# Patient Record
Sex: MALE | Race: WHITE | Employment: FULL TIME | URBAN - METROPOLITAN AREA
[De-identification: names, ages, dates, MRNs, and addresses within clinical notes are randomized per-mention and may not be internally consistent; named-entity substitution may affect disease eponyms.]

---

## 2021-11-28 ENCOUNTER — HOSPITAL ENCOUNTER (EMERGENCY)
Age: 41
Discharge: PSYCHIATRIC OR SUBSTANCE USE FACILITY | End: 2021-11-29
Attending: EMERGENCY MEDICINE
Payer: COMMERCIAL

## 2021-11-28 VITALS
DIASTOLIC BLOOD PRESSURE: 113 MMHG | BODY MASS INDEX: 24.34 KG/M2 | HEIGHT: 70 IN | HEART RATE: 94 BPM | RESPIRATION RATE: 20 BRPM | WEIGHT: 170 LBS | SYSTOLIC BLOOD PRESSURE: 159 MMHG | TEMPERATURE: 99 F | OXYGEN SATURATION: 98 %

## 2021-11-28 DIAGNOSIS — F20.9 SCHIZOPHRENIA, UNSPECIFIED TYPE (HCC): Primary | ICD-10-CM

## 2021-11-28 DIAGNOSIS — R46.89 AGGRESSIVE BEHAVIOR: ICD-10-CM

## 2021-11-28 LAB
ALBUMIN SERPL-MCNC: 3.8 G/DL (ref 3.5–5)
ALBUMIN/GLOB SERPL: 1.3 {RATIO} (ref 1.1–2.2)
ALP SERPL-CCNC: 68 U/L (ref 45–117)
ALT SERPL-CCNC: 36 U/L (ref 12–78)
AMPHET UR QL SCN: NEGATIVE
ANION GAP SERPL CALC-SCNC: 10 MMOL/L (ref 5–15)
APAP SERPL-MCNC: <2 UG/ML (ref 10–30)
APPEARANCE UR: CLEAR
AST SERPL-CCNC: 48 U/L (ref 15–37)
BACTERIA URNS QL MICRO: NEGATIVE /HPF
BARBITURATES UR QL SCN: NEGATIVE
BASOPHILS # BLD: 0 K/UL (ref 0–0.1)
BASOPHILS NFR BLD: 0 % (ref 0–1)
BENZODIAZ UR QL: NEGATIVE
BILIRUB SERPL-MCNC: 1.2 MG/DL (ref 0.2–1)
BILIRUB UR QL CFM: NEGATIVE
BUN SERPL-MCNC: 17 MG/DL (ref 6–20)
BUN/CREAT SERPL: 13 (ref 12–20)
CALCIUM SERPL-MCNC: 9.3 MG/DL (ref 8.5–10.1)
CANNABINOIDS UR QL SCN: POSITIVE
CHLORIDE SERPL-SCNC: 105 MMOL/L (ref 97–108)
CO2 SERPL-SCNC: 25 MMOL/L (ref 21–32)
COCAINE UR QL SCN: NEGATIVE
COLOR UR: ABNORMAL
COVID-19 RAPID TEST, COVR: NOT DETECTED
CREAT SERPL-MCNC: 1.28 MG/DL (ref 0.7–1.3)
DIFFERENTIAL METHOD BLD: ABNORMAL
DRUG SCRN COMMENT,DRGCM: ABNORMAL
EOSINOPHIL # BLD: 0 K/UL (ref 0–0.4)
EOSINOPHIL NFR BLD: 0 % (ref 0–7)
EPITH CASTS URNS QL MICRO: ABNORMAL /LPF
ERYTHROCYTE [DISTWIDTH] IN BLOOD BY AUTOMATED COUNT: 11.3 % (ref 11.5–14.5)
ETHANOL SERPL-MCNC: <10 MG/DL
GLOBULIN SER CALC-MCNC: 2.9 G/DL (ref 2–4)
GLUCOSE SERPL-MCNC: 101 MG/DL (ref 65–100)
GLUCOSE UR STRIP.AUTO-MCNC: NEGATIVE MG/DL
HCT VFR BLD AUTO: 38.7 % (ref 36.6–50.3)
HGB BLD-MCNC: 13.5 G/DL (ref 12.1–17)
HGB UR QL STRIP: NEGATIVE
HYALINE CASTS URNS QL MICRO: ABNORMAL /LPF (ref 0–5)
IMM GRANULOCYTES # BLD AUTO: 0 K/UL (ref 0–0.04)
IMM GRANULOCYTES NFR BLD AUTO: 0 % (ref 0–0.5)
KETONES UR QL STRIP.AUTO: 40 MG/DL
LEUKOCYTE ESTERASE UR QL STRIP.AUTO: NEGATIVE
LYMPHOCYTES # BLD: 1.6 K/UL (ref 0.8–3.5)
LYMPHOCYTES NFR BLD: 15 % (ref 12–49)
MCH RBC QN AUTO: 33.9 PG (ref 26–34)
MCHC RBC AUTO-ENTMCNC: 34.9 G/DL (ref 30–36.5)
MCV RBC AUTO: 97.2 FL (ref 80–99)
METHADONE UR QL: NEGATIVE
MONOCYTES # BLD: 1 K/UL (ref 0–1)
MONOCYTES NFR BLD: 10 % (ref 5–13)
NEUTS SEG # BLD: 7.8 K/UL (ref 1.8–8)
NEUTS SEG NFR BLD: 75 % (ref 32–75)
NITRITE UR QL STRIP.AUTO: NEGATIVE
NRBC # BLD: 0 K/UL (ref 0–0.01)
NRBC BLD-RTO: 0 PER 100 WBC
OPIATES UR QL: NEGATIVE
PCP UR QL: NEGATIVE
PH UR STRIP: 5.5 [PH] (ref 5–8)
PLATELET # BLD AUTO: 146 K/UL (ref 150–400)
PMV BLD AUTO: 9.9 FL (ref 8.9–12.9)
POTASSIUM SERPL-SCNC: 3.6 MMOL/L (ref 3.5–5.1)
PROT SERPL-MCNC: 6.7 G/DL (ref 6.4–8.2)
PROT UR STRIP-MCNC: 30 MG/DL
RBC # BLD AUTO: 3.98 M/UL (ref 4.1–5.7)
RBC #/AREA URNS HPF: ABNORMAL /HPF (ref 0–5)
SALICYLATES SERPL-MCNC: 4.5 MG/DL (ref 2.8–20)
SODIUM SERPL-SCNC: 140 MMOL/L (ref 136–145)
SOURCE, COVRS: NORMAL
SP GR UR REFRACTOMETRY: 1.03 (ref 1–1.03)
UA: UC IF INDICATED,UAUC: ABNORMAL
UROBILINOGEN UR QL STRIP.AUTO: 1 EU/DL (ref 0.2–1)
WBC # BLD AUTO: 10.4 K/UL (ref 4.1–11.1)
WBC URNS QL MICRO: ABNORMAL /HPF (ref 0–4)

## 2021-11-28 PROCEDURE — 87635 SARS-COV-2 COVID-19 AMP PRB: CPT

## 2021-11-28 PROCEDURE — 81001 URINALYSIS AUTO W/SCOPE: CPT

## 2021-11-28 PROCEDURE — 80307 DRUG TEST PRSMV CHEM ANLYZR: CPT

## 2021-11-28 PROCEDURE — 80179 DRUG ASSAY SALICYLATE: CPT

## 2021-11-28 PROCEDURE — 96372 THER/PROPH/DIAG INJ SC/IM: CPT

## 2021-11-28 PROCEDURE — 80053 COMPREHEN METABOLIC PANEL: CPT

## 2021-11-28 PROCEDURE — 85025 COMPLETE CBC W/AUTO DIFF WBC: CPT

## 2021-11-28 PROCEDURE — 80143 DRUG ASSAY ACETAMINOPHEN: CPT

## 2021-11-28 PROCEDURE — 36415 COLL VENOUS BLD VENIPUNCTURE: CPT

## 2021-11-28 PROCEDURE — 82077 ASSAY SPEC XCP UR&BREATH IA: CPT

## 2021-11-28 PROCEDURE — 99284 EMERGENCY DEPT VISIT MOD MDM: CPT

## 2021-11-28 NOTE — ED NOTES
RN made 2 attempts to draw blood as ordered, pt refused to be stuck or get COVID swab. Officer at bedside, [address] at bedside. Per Crisis, pt has right to refuse and will be TDO pending further work up.

## 2021-11-29 ENCOUNTER — HOSPITAL ENCOUNTER (INPATIENT)
Age: 41
LOS: 3 days | Discharge: HOME | DRG: 753 | End: 2021-12-02
Attending: PSYCHIATRY & NEUROLOGY | Admitting: PSYCHIATRY & NEUROLOGY
Payer: COMMERCIAL

## 2021-11-29 PROBLEM — F31.2 BIPOLAR DISORDER, CURRENT EPISODE MANIC, SEVERE WITH PSYCHOTIC FEATURES (HCC): Status: ACTIVE | Noted: 2021-11-29

## 2021-11-29 PROCEDURE — 74011000250 HC RX REV CODE- 250: Performed by: EMERGENCY MEDICINE

## 2021-11-29 PROCEDURE — 96372 THER/PROPH/DIAG INJ SC/IM: CPT

## 2021-11-29 PROCEDURE — 65220000003 HC RM SEMIPRIVATE PSYCH

## 2021-11-29 PROCEDURE — 74011250636 HC RX REV CODE- 250/636: Performed by: EMERGENCY MEDICINE

## 2021-11-29 RX ORDER — LORAZEPAM 2 MG/ML
1 INJECTION INTRAMUSCULAR
Status: DISCONTINUED | OUTPATIENT
Start: 2021-11-29 | End: 2021-12-02 | Stop reason: HOSPADM

## 2021-11-29 RX ORDER — BENZTROPINE MESYLATE 1 MG/1
1 TABLET ORAL
Status: DISCONTINUED | OUTPATIENT
Start: 2021-11-29 | End: 2021-12-02 | Stop reason: HOSPADM

## 2021-11-29 RX ORDER — DIPHENHYDRAMINE HYDROCHLORIDE 50 MG/ML
50 INJECTION, SOLUTION INTRAMUSCULAR; INTRAVENOUS
Status: DISCONTINUED | OUTPATIENT
Start: 2021-11-29 | End: 2021-12-02 | Stop reason: HOSPADM

## 2021-11-29 RX ORDER — ADHESIVE BANDAGE
30 BANDAGE TOPICAL DAILY PRN
Status: DISCONTINUED | OUTPATIENT
Start: 2021-11-29 | End: 2021-12-02 | Stop reason: HOSPADM

## 2021-11-29 RX ORDER — TRAZODONE HYDROCHLORIDE 50 MG/1
50 TABLET ORAL
Status: DISCONTINUED | OUTPATIENT
Start: 2021-11-29 | End: 2021-12-01

## 2021-11-29 RX ORDER — HALOPERIDOL 5 MG/ML
5 INJECTION INTRAMUSCULAR
Status: DISCONTINUED | OUTPATIENT
Start: 2021-11-29 | End: 2021-12-02 | Stop reason: HOSPADM

## 2021-11-29 RX ORDER — HYDROXYZINE 25 MG/1
50 TABLET, FILM COATED ORAL
Status: DISCONTINUED | OUTPATIENT
Start: 2021-11-29 | End: 2021-12-02 | Stop reason: HOSPADM

## 2021-11-29 RX ORDER — OLANZAPINE 5 MG/1
5 TABLET ORAL
Status: DISCONTINUED | OUTPATIENT
Start: 2021-11-29 | End: 2021-12-02 | Stop reason: HOSPADM

## 2021-11-29 RX ORDER — ZIPRASIDONE MESYLATE 20 MG/ML
INJECTION, POWDER, LYOPHILIZED, FOR SOLUTION INTRAMUSCULAR
Status: DISCONTINUED
Start: 2021-11-29 | End: 2021-11-29 | Stop reason: HOSPADM

## 2021-11-29 RX ORDER — ACETAMINOPHEN 325 MG/1
650 TABLET ORAL
Status: DISCONTINUED | OUTPATIENT
Start: 2021-11-29 | End: 2021-12-02 | Stop reason: HOSPADM

## 2021-11-29 RX ADMIN — WATER 20 MG: 1 INJECTION INTRAMUSCULAR; INTRAVENOUS; SUBCUTANEOUS at 02:06

## 2021-11-29 NOTE — BH NOTES
PSYCHOSOCIAL ASSESSMENT  :Patient identifying info:   Brandee Rodrigez is a 46 y.o., male admitted 11/29/2021  8:55 AM     Presenting problem and precipitating factors: per chart review, patient [place] [address], preaching to and stabbing pumpkins. He has reportedly been without medications for at least two months. Initial presentation was labile mood and attitude, grew increasingly agitated. In the hospital, he was largely uncooperative with some verbalized aggression toward staff. He endorsed AH (God speaks to him). Patient reports there was traffic going [place] [address]. Pulled off to the side of the road. He was \"doing something\" and \"talking to someone, we can call him God\". People may have thought he was directing it toward someone else. Denied SI/HI, AVH. Asked about pumpkins and knife- he reports \"it was about something else\". Reports he's here to help because his mission never ends. Reports his mother likes him on medicine but he doesn't need them.      Mental status assessment: pressured speech, spiritually preoccupied, prolonged eye contact, labile mood, tangential thought stream, content delusional, poor insight and poor judgment    Strengths: desire to do better, working on gaining more insight into himself    Collateral information: Samaria Dao (mother) [phone]    Current psychiatric /substance abuse providers and contact info: no current providers    Previous psychiatric/substance abuse providers and response to treatment: multiple past admissions including [facility] in 2020 for one month    Family history of mental illness or substance abuse: none reported    Substance abuse history:  UDS+ THC, BAL<10; reports he smokes marijuana, smokes cigarettes, and drinks a beer occasionally  Social History     Tobacco Use    Smoking status: Current Every Day Smoker    Smokeless tobacco: Not on file   Substance Use Topics    Alcohol use: Yes       History of biomedical complications associated with substance abuse : none reported    Patient's current acceptance of treatment or motivation for change: TDO    Family constellation: asked, reports he's unsure; no kids    Is significant other involved? n/a    Describe support system: reports limited supports    Describe living arrangements and home environment: homeless- moved out of mother's house about a week ago    Health issues: reports \"probably\"; family history of hypertension.  Complaining of pain in collarbone (left), reports incident at age 18    Trauma history: none reported    Legal issues: several driving charges; court in [place] on 21- dismissed animal cruelty, disorderly conduct, guilty of FTC-CA    History of  service: none reported    Financial status: employed    Confucianist/cultural factors: spiritually preoccupied with his mission    Education/work history:     Have you been licensed as a health care professional (current or ): no    Leisure and recreation preferences: listening to music, working    Describe coping skills: limited and ineffective    Tori Del Toro  2021

## 2021-11-29 NOTE — BH NOTES
GROUP THERAPY PROGRESS NOTE    Patient did not participate in Discharge Planning Group.  Asked not to wake or invite patient as he just arrived to the unit and wanted to rest.    [address] [facility]

## 2021-11-29 NOTE — ED NOTES
Bedside and Verbal shift change report given to this RN (oncoming nurse) by Elmer Carr RN (offgoing nurse). Report included the following information SBAR.

## 2021-11-29 NOTE — PROGRESS NOTES
Problem: Anxiety  Goal: *Alleviation of anxiety  Outcome: Progressing Towards Goal  Goal: *Alleviation of anxiety (Palliative Care)  Outcome: Progressing Towards Goal     Problem: Altered Thought Process (Adult/Pediatric)  Goal: *STG: Participates in treatment plan  Outcome: Progressing Towards Goal

## 2021-11-29 NOTE — ED NOTES
Bedside and Verbal shift change report given to Aylin Hoffmann RN (oncoming nurse) by Liya Pina RN (offgoing nurse). Report included the following information SBAR, Kardex, ED Summary, Intake/Output, MAR and Recent Results.

## 2021-11-29 NOTE — ED NOTES
RN again attempted to get blood work and COVID swab, pt refused. Pt offered food and water and said he would throw at staff.

## 2021-11-29 NOTE — GROUP NOTE
JORGE LUIS  GROUP DOCUMENTATION INDIVIDUAL                                                                          Group Therapy Note    Date: 11/29/2021    Group Start Time: 0900  Group End Time: 1000  Group Topic: Topic Group    [facility] 3 ACUTE [facility]    [place], [address] [facility] GROUP DOCUMENTATION GROUP    Group Therapy Note    Attendees: 5         Attendance: Attended    Patient's Goal:  To develop a personal plan for success    Interventions/techniques: Supported-positive coping strategies,goals    Follows Directions:  Followed directions    Interactions: Interacted appropriately    Mental Status: Calm    Behavior/appearance: Attentive, Cooperative and Needed prompting    Goals Achieved: Able to engage in interactions, Able to listen to others, Able to self-disclose and Discussed coping    Tad Destini

## 2021-11-29 NOTE — PROGRESS NOTES
Behavioral Services  Medicare Certification Upon Admission    I certify that this patient's inpatient psychiatric hospital admission is medically necessary for:    [x] (1) Treatment which could reasonably be expected to improve this patient's condition,       [x] (2) Or for diagnostic study;     AND     [x](2) The inpatient psychiatric services are provided while the individual is under the care of a physician and are included in the individualized plan of care.     Estimated length of stay/service 5 - 7 days    Plan for post-hospital care per social work    Electronically signed by Roge Senior MD on 11/29/2021 at 9:38 AM

## 2021-11-29 NOTE — ED NOTES
Patient awake and yelling inappropriate things at the deputy sitting with him. Patient becoming aggressive and making threats. MD Georges Luna made aware. Verbal given by MD Georges Luna for 20mg Geodon IM. Patient continues to refuse vitals. He remains in forensic restraints. [place] remains at bedside.

## 2021-11-29 NOTE — ED NOTES
Patient asleep on stretcher, laying on right side. Respirations equal and non labored. Patient remains in forensic restraints. [place] remains at bedside.

## 2021-11-29 NOTE — PROGRESS NOTES
Problem: Discharge Planning  Goal: *Discharge to safe environment  Outcome: Not Progressing Towards Goal  Note: Patient identifies home as a safe environment. Patient will return home upon discharge. Goal: *Knowledge of medication management  Outcome: Not Progressing Towards Goal  Note: Patient is refusing all medications. Goal: *Knowledge of discharge instructions  Outcome: Not Progressing Towards Goal  Note: Patient does not verbalize understanding of goals for treatment or safe discharge.

## 2021-11-29 NOTE — PROGRESS NOTES
Pt is a poor historian while assessing. Pt previous notes state the pt has a history of schizophrenia. Family had called 's office due to pt being off medication for at least 2 months. Pt had been going around town slashing pumpkins and authorities had witnessed pt waving a knife in the air. While in ED pt was yelling and inappropriate. Pt was religiously preoccupied and having AH which he claimed were God speaking to him. In person pt is irritable about being on the unit and denies having a knife and anything involving smashing pumpkins. Pt's report was that he was driving down [address] and there was a large amount of traffic that had come to a halt so they pulled off to side of the rode and began speaking to God. Pt remains religiously preoccupied and blaming the police for being admitted. Pt talks about how [facility] is not free anymore and that only people who act like robots and without emotions are allowed to exist.    At 1800 Pt has become more calm and collective when speaking. Pt is concerned about which company had towed his truck so he knows where to pick it up at when being discharged. [facility] [employer] [facility]aryl [facility] recovery was contacted and they state they had not towed a 2016 [employer] today.

## 2021-11-29 NOTE — ED PROVIDER NOTES
EMERGENCY DEPARTMENT HISTORY AND PHYSICAL EXAM      Date: 11/28/2021  Patient Name: Johana Treviño    History of Presenting Illness     Chief Complaint   Patient presents with    Mental Health Problem     pt's family called [facility]'s office with concern that pt is schizophrenic and off his meds, was seen stabbing pumpkins around town and then seen weilding knife in air by authorities and [employer] issued [phone]       History Provided By: Patient    HPI: Johana Treviño, 46 y.o. male presents to the ED with cc of mental health problem. According to nursing notes, the patient has history of schizophrenia. His family called the 's office, because they were concerned that he has been out of his medications. He also has been going around town [employer], and then the authorities saw him wielding a knife in the air. He denies suicidal or homicidal ideation currently. He denies chest pain, shortness of breath, fever, abdominal pain, headache, diarrhea or dysuria. He is unvaccinated against COVID-19    There are no other complaints, changes, or physical findings at this time. PCP: None    No current facility-administered medications on file prior to encounter. No current outpatient medications on file prior to encounter. Past History     Past Medical History:  Past Medical History:   Diagnosis Date    Psychiatric disorder        Past Surgical History:  No past surgical history on file. Family History:  No family history on file. Social History:  Social History     Tobacco Use    Smoking status: Current Every Day Smoker    Smokeless tobacco: Not on file   Substance Use Topics    Alcohol use: Yes    Drug use: Not on file       Allergies:  No Known Allergies      Review of Systems   Review of Systems   Constitutional: Negative for fever. HENT: Negative for congestion. Eyes: Negative. Respiratory: Negative for shortness of breath. Cardiovascular: Negative for chest pain. Gastrointestinal: Negative for abdominal pain. Endocrine: Negative for heat intolerance. Genitourinary: Negative. Musculoskeletal: Negative for back pain. Skin: Negative for rash. Allergic/Immunologic: Negative for immunocompromised state. Neurological: Negative for dizziness. Hematological: Does not bruise/bleed easily. Psychiatric/Behavioral: Negative. All other systems reviewed and are negative. Physical Exam   Physical Exam  Vitals and nursing note reviewed. Constitutional:       General: He is not in acute distress. Appearance: He is well-developed. HENT:      Head: Normocephalic and atraumatic. Cardiovascular:      Rate and Rhythm: Normal rate and regular rhythm. Heart sounds: Normal heart sounds. Pulmonary:      Effort: Pulmonary effort is normal.      Breath sounds: Normal breath sounds. Abdominal:      General: Bowel sounds are normal.      Palpations: Abdomen is soft. Musculoskeletal:         General: Normal range of motion. Cervical back: Normal range of motion. Skin:     General: Skin is warm and dry. Neurological:      General: No focal deficit present. Mental Status: He is alert and oriented to person, place, and time. Coordination: Coordination normal.   Psychiatric:      Comments:  Thought content is disorganized, the patient fluctuates from being cooperative to being uncooperative         Diagnostic Study Results     Labs -     Recent Results (from the past 12 hour(s))   CBC WITH AUTOMATED DIFF    Collection Time: 11/28/21  9:19 PM   Result Value Ref Range    WBC 10.4 4.1 - 11.1 K/uL    RBC 3.98 (L) 4.10 - 5.70 M/uL    HGB 13.5 12.1 - 17.0 g/dL    HCT 38.7 36.6 - 50.3 %    MCV 97.2 80.0 - 99.0 FL    MCH 33.9 26.0 - 34.0 PG    MCHC 34.9 30.0 - 36.5 g/dL    RDW 11.3 (L) 11.5 - 14.5 %    PLATELET 562 (L) 520 - 400 K/uL    MPV 9.9 8.9 - 12.9 FL    NRBC 0.0 0  WBC    ABSOLUTE NRBC 0.00 0.00 - 0.01 K/uL    NEUTROPHILS 75 32 - 75 % LYMPHOCYTES 15 12 - 49 %    MONOCYTES 10 5 - 13 %    EOSINOPHILS 0 0 - 7 %    BASOPHILS 0 0 - 1 %    IMMATURE GRANULOCYTES 0 0.0 - 0.5 %    ABS. NEUTROPHILS 7.8 1.8 - 8.0 K/UL    ABS. LYMPHOCYTES 1.6 0.8 - 3.5 K/UL    ABS. MONOCYTES 1.0 0.0 - 1.0 K/UL    ABS. EOSINOPHILS 0.0 0.0 - 0.4 K/UL    ABS. BASOPHILS 0.0 0.0 - 0.1 K/UL    ABS. IMM. GRANS. 0.0 0.00 - 0.04 K/UL    DF AUTOMATED     METABOLIC PANEL, COMPREHENSIVE    Collection Time: 11/28/21  9:19 PM   Result Value Ref Range    Sodium 140 136 - 145 mmol/L    Potassium 3.6 3.5 - 5.1 mmol/L    Chloride 105 97 - 108 mmol/L    CO2 25 21 - 32 mmol/L    Anion gap 10 5 - 15 mmol/L    Glucose 101 (H) 65 - 100 mg/dL    BUN 17 6 - 20 MG/DL    Creatinine 1.28 0.70 - 1.30 MG/DL    BUN/Creatinine ratio 13 12 - 20      GFR est AA >60 >60 ml/min/1.73m2    GFR est non-AA >60 >60 ml/min/1.73m2    Calcium 9.3 8.5 - 10.1 MG/DL    Bilirubin, total 1.2 (H) 0.2 - 1.0 MG/DL    ALT (SGPT) 36 12 - 78 U/L    AST (SGOT) 48 (H) 15 - 37 U/L    Alk.  phosphatase 68 45 - 117 U/L    Protein, total 6.7 6.4 - 8.2 g/dL    Albumin 3.8 3.5 - 5.0 g/dL    Globulin 2.9 2.0 - 4.0 g/dL    A-G Ratio 1.3 1.1 - 2.2     ETHYL ALCOHOL    Collection Time: 11/28/21  9:19 PM   Result Value Ref Range    ALCOHOL(ETHYL),SERUM <72 <63 MG/DL   SALICYLATE    Collection Time: 11/28/21  9:19 PM   Result Value Ref Range    Salicylate level 4.5 2.8 - 20.0 MG/DL   ACETAMINOPHEN    Collection Time: 11/28/21  9:19 PM   Result Value Ref Range    Acetaminophen level <2 (L) 10 - 30 ug/mL   COVID-19 RAPID TEST    Collection Time: 11/28/21  9:19 PM   Result Value Ref Range    Specimen source Nasopharyngeal      COVID-19 rapid test Not detected NOTD     DRUG SCREEN, URINE    Collection Time: 11/28/21 10:09 PM   Result Value Ref Range    AMPHETAMINES Negative NEG      BARBITURATES Negative NEG      BENZODIAZEPINES Negative NEG      COCAINE Negative NEG      METHADONE Negative NEG      OPIATES Negative NEG      PCP(PHENCYCLIDINE) Negative NEG      THC (TH-CANNABINOL) Positive (A) NEG      Drug screen comment (NOTE)    URINALYSIS W/ REFLEX CULTURE    Collection Time: 11/28/21 10:09 PM    Specimen: Urine    Urine specimen   Result Value Ref Range    Color DARK YELLOW      Appearance CLEAR CLEAR      Specific gravity 1.027 1.003 - 1.030      pH (UA) 5.5 5.0 - 8.0      Protein 30 (A) NEG mg/dL    Glucose Negative NEG mg/dL    Ketone 40 (A) NEG mg/dL    Blood Negative NEG      Urobilinogen 1.0 0.2 - 1.0 EU/dL    Nitrites Negative NEG      Leukocyte Esterase Negative NEG      WBC 0-4 0 - 4 /hpf    RBC 0-5 0 - 5 /hpf    Epithelial cells FEW FEW /lpf    Bacteria Negative NEG /hpf    UA:UC IF INDICATED CULTURE NOT INDICATED BY UA RESULT CNI      Hyaline cast 2-5 0 - 5 /lpf   BILIRUBIN, CONFIRM    Collection Time: 11/28/21 10:09 PM   Result Value Ref Range    Bilirubin UA, confirm Negative NEG         Radiologic Studies -   No orders to display     CT Results  (Last 48 hours)    None        CXR Results  (Last 48 hours)    None          Medical Decision Making   I am the first provider for this patient. I reviewed the vital signs, available nursing notes, past medical history, past surgical history, family history and social history. Vital Signs-Reviewed the patient's vital signs. Patient Vitals for the past 12 hrs:   Temp Pulse Resp BP SpO2   11/28/21 1928 99 °F (37.2 °C)       11/28/21 1737  94 20 (!) 159/113 98 %           Records Reviewed: Nursing Notes and Old Medical Records    Provider Notes (Medical Decision Making):   I, schizophrenia, homicidal ideation    ED Course:   Initial assessment performed. The patients presenting problems have been discussed, and they are in agreement with the care plan formulated and outlined with them. I have encouraged them to ask questions as they arise throughout their visit. Consult note:    Patient is under emergency custody order. He has been evaluated by [facility].       Progress note:    Patient is medically cleared             Critical Care Time:   0    Disposition:  Transfer    DISCHARGE PLAN:  1. There are no discharge medications for this patient. 2.   Follow-up Information    None       3. Return to ED if worse     Diagnosis     Clinical Impression:   1. Schizophrenia, unspecified type ([facility] [address]    2. Aggressive behavior        Attestations:    Roderick Zaman MD    Please note that this dictation was completed with [employer], the computer voice recognition software. Quite often unanticipated grammatical, syntax, homophones, and other interpretive errors are inadvertently transcribed by the computer software. Please disregard these errors. Please excuse any errors that have escaped final proofreading. Thank you.

## 2021-11-29 NOTE — ED NOTES
Attempted to get a full set of vitals from pt. Pt refused and stated, \"come back in an hour or so\".

## 2021-11-29 NOTE — PROGRESS NOTES
[facility] Admission Pharmacy Medication Reconciliation     Information obtained from: Patient interview  RxQuery data available1: No    Comments/recommendations: Denies taking any prescription or over-the-counter medications. Medication changes (since last review): None    The [facility] () was accessed to determine fill history of any controlled medications. No controlled medications filled within the past 2 years. 1RxQuery pharmacy benefit data reflects medications filled and processed through the patient's insurance, however                this data does NOT capture whether the medication was picked up or is currently being taken by the patient. Total Time Spent: 5 minutes    Past Medical History/Disease States:  Past Medical History:   Diagnosis Date    Psychiatric disorder        Patient allergies:    Allergies as of 11/29/2021    (No Known Allergies)       Prior to admission medications:   None        Thank you,  HENNY Sanders [facility], [address] [place] [address] Nw: [phone] ([id number])  Pharmacy: [phone] ([id number])

## 2021-11-30 LAB
ALBUMIN SERPL-MCNC: 4.3 G/DL (ref 3.5–5)
ALBUMIN/GLOB SERPL: 1.6 {RATIO} (ref 1.1–2.2)
ALP SERPL-CCNC: 81 U/L (ref 45–117)
ALT SERPL-CCNC: 41 U/L (ref 12–78)
ANION GAP SERPL CALC-SCNC: 6 MMOL/L (ref 5–15)
AST SERPL-CCNC: 36 U/L (ref 15–37)
BILIRUB SERPL-MCNC: 0.7 MG/DL (ref 0.2–1)
BUN SERPL-MCNC: 14 MG/DL (ref 6–20)
BUN/CREAT SERPL: 12 (ref 12–20)
CALCIUM SERPL-MCNC: 9 MG/DL (ref 8.5–10.1)
CHLORIDE SERPL-SCNC: 106 MMOL/L (ref 97–108)
CHOLEST SERPL-MCNC: 99 MG/DL
CO2 SERPL-SCNC: 28 MMOL/L (ref 21–32)
CREAT SERPL-MCNC: 1.13 MG/DL (ref 0.7–1.3)
ERYTHROCYTE [DISTWIDTH] IN BLOOD BY AUTOMATED COUNT: 11.3 % (ref 11.5–14.5)
GLOBULIN SER CALC-MCNC: 2.7 G/DL (ref 2–4)
GLUCOSE SERPL-MCNC: 95 MG/DL (ref 65–100)
HCT VFR BLD AUTO: 44.5 % (ref 36.6–50.3)
HDLC SERPL-MCNC: 46 MG/DL
HDLC SERPL: 2.2 {RATIO} (ref 0–5)
HGB BLD-MCNC: 15 G/DL (ref 12.1–17)
LDLC SERPL CALC-MCNC: 39.2 MG/DL (ref 0–100)
MCH RBC QN AUTO: 34.2 PG (ref 26–34)
MCHC RBC AUTO-ENTMCNC: 33.7 G/DL (ref 30–36.5)
MCV RBC AUTO: 101.4 FL (ref 80–99)
NRBC # BLD: 0 K/UL (ref 0–0.01)
NRBC BLD-RTO: 0 PER 100 WBC
PLATELET # BLD AUTO: 162 K/UL (ref 150–400)
PMV BLD AUTO: 10.7 FL (ref 8.9–12.9)
POTASSIUM SERPL-SCNC: 4.3 MMOL/L (ref 3.5–5.1)
PROT SERPL-MCNC: 7 G/DL (ref 6.4–8.2)
RBC # BLD AUTO: 4.39 M/UL (ref 4.1–5.7)
SODIUM SERPL-SCNC: 140 MMOL/L (ref 136–145)
TRIGL SERPL-MCNC: 69 MG/DL (ref ?–150)
TSH SERPL DL<=0.05 MIU/L-ACNC: 1 UIU/ML (ref 0.36–3.74)
VLDLC SERPL CALC-MCNC: 13.8 MG/DL
WBC # BLD AUTO: 7 K/UL (ref 4.1–11.1)

## 2021-11-30 PROCEDURE — 36415 COLL VENOUS BLD VENIPUNCTURE: CPT

## 2021-11-30 PROCEDURE — 80061 LIPID PANEL: CPT

## 2021-11-30 PROCEDURE — 80053 COMPREHEN METABOLIC PANEL: CPT

## 2021-11-30 PROCEDURE — 65220000003 HC RM SEMIPRIVATE PSYCH

## 2021-11-30 PROCEDURE — 85027 COMPLETE CBC AUTOMATED: CPT

## 2021-11-30 PROCEDURE — 84443 ASSAY THYROID STIM HORMONE: CPT

## 2021-11-30 NOTE — GROUP NOTE
[facility] GROUP DOCUMENTATION INDIVIDUAL                                                                          Group Therapy Note    Date: 11/30/2021    Group Start Time: 0900  Group End Time: 1000  Group Topic: Topic Group    [address] [facility] 3 ACUTE BEHAV [facility], [address] [facility] GROUP DOCUMENTATION GROUP    Group Therapy Note    Attendees: 6         Attendance: Did not attend    Patient's Goal:      Interventions/techniques:  Piyush Rousseau

## 2021-11-30 NOTE — BH NOTES
Psychiatric Progress Note    Patient: Onetha Klinefelter MRN: [id number]  SSN: [id number]    YOB: 1980  Age: 46 y.o. Sex: male      Admit Date: 11/29/2021    LOS: 1 day     Subjective:     Onetha Klinefelter  reports feeling much better and moods are upset. States having bills to pay and debts to fulfill. Denies SI/HI/AH/VH. No aggression or violence. Appropriately interactive and aware. Tolerating medications well. Eating well and sleeping well. Waiting his day in court in order to return to his life.     Objective:     Vitals:    11/29/21 1046 11/29/21 1944 11/30/21 0826   BP: (!) 148/92 133/64 137/82   Pulse: 90 (!) 53 83   Resp: 16 15 18   Temp: 98.8 °F (37.1 °C) 98.7 °F (37.1 °C) 98 °F (36.7 °C)   SpO2:   100%        Mental Status Exam:   Sensorium  oriented to time, place and person   Relations cooperative   Eye Contact appropriate   Appearance:  age appropriate   Speech:  normal volume and non-pressured   Thought Process: goal directed   Thought Content free of hallucinations   Suicidal ideations none   Mood:  depressed   Affect:  Fair range   Memory   limited   Concentration:  adequate   Insight:  limited   Judgment:  fair       MEDICATIONS:  Current Facility-Administered Medications   Medication Dose Route Frequency    OLANZapine (ZyPREXA) tablet 5 mg  5 mg Oral Q6H PRN    haloperidol lactate (HALDOL) injection 5 mg  5 mg IntraMUSCular Q6H PRN    benztropine (COGENTIN) tablet 1 mg  1 mg Oral BID PRN    diphenhydrAMINE (BENADRYL) injection 50 mg  50 mg IntraMUSCular BID PRN    hydrOXYzine HCL (ATARAX) tablet 50 mg  50 mg Oral TID PRN    LORazepam (ATIVAN) injection 1 mg  1 mg IntraMUSCular Q4H PRN    traZODone (DESYREL) tablet 50 mg  50 mg Oral QHS PRN    acetaminophen (TYLENOL) tablet 650 mg  650 mg Oral Q4H PRN    magnesium hydroxide (MILK OF MAGNESIA) 400 mg/5 mL oral suspension 30 mL  30 mL Oral DAILY PRN      DISCUSSION:   the risks and benefits of the proposed medication  patient given opportunity to ask questions    Lab/Data Review: All lab results for the last 24 hours reviewed. Recent Results (from the past 24 hour(s))   CBC W/O DIFF    Collection Time: 11/30/21  6:10 AM   Result Value Ref Range    WBC 7.0 4.1 - 11.1 K/uL    RBC 4.39 4. 10 - 5.70 M/uL    HGB 15.0 12.1 - 17.0 g/dL    HCT 44.5 36.6 - 50.3 %    .4 (H) 80.0 - 99.0 FL    MCH 34.2 (H) 26.0 - 34.0 PG    MCHC 33.7 30.0 - 36.5 g/dL    RDW 11.3 (L) 11.5 - 14.5 %    PLATELET 866 642 - 848 K/uL    MPV 10.7 8.9 - 12.9 FL    NRBC 0.0 0  WBC    ABSOLUTE NRBC 0.00 0.00 - 0.01 K/uL   TSH 3RD GENERATION    Collection Time: 11/30/21  6:10 AM   Result Value Ref Range    TSH 1.00 0.36 - 3.74 uIU/mL   LIPID PANEL    Collection Time: 11/30/21  6:10 AM   Result Value Ref Range    Cholesterol, total 99 <200 MG/DL    Triglyceride 69 <150 MG/DL    HDL Cholesterol 46 MG/DL    LDL, calculated 39.2 0 - 100 MG/DL    VLDL, calculated 13.8 MG/DL    CHOL/HDL Ratio 2.2 0.0 - 5.0     METABOLIC PANEL, COMPREHENSIVE    Collection Time: 11/30/21  6:10 AM   Result Value Ref Range    Sodium 140 136 - 145 mmol/L    Potassium 4.3 3.5 - 5.1 mmol/L    Chloride 106 97 - 108 mmol/L    CO2 28 21 - 32 mmol/L    Anion gap 6 5 - 15 mmol/L    Glucose 95 65 - 100 mg/dL    BUN 14 6 - 20 MG/DL    Creatinine 1.13 0.70 - 1.30 MG/DL    BUN/Creatinine ratio 12 12 - 20      GFR est AA >60 >60 ml/min/1.73m2    GFR est non-AA >60 >60 ml/min/1.73m2    Calcium 9.0 8.5 - 10.1 MG/DL    Bilirubin, total 0.7 0.2 - 1.0 MG/DL    ALT (SGPT) 41 12 - 78 U/L    AST (SGOT) 36 15 - 37 U/L    Alk.  phosphatase 81 45 - 117 U/L    Protein, total 7.0 6.4 - 8.2 g/dL    Albumin 4.3 3.5 - 5.0 g/dL    Globulin 2.7 2.0 - 4.0 g/dL    A-G Ratio 1.6 1.1 - 2.2           Assessment:     Principal Problem:    Bipolar disorder, current episode manic, severe with psychotic features ([facility] (11/29/2021)        Plan:     Continue current care  Collateral information  Medication modification as appropriate  Court hearing is tomorrow  Disposition planning with social work    I certify that this patient's inpatient psychiatric hospital services furnished since the previous certification were, and continue to be, required for treatment that could reasonably be expected to improve the patient's condition, or for diagnostic study, and that the patient continues to need, on a daily basis, active treatment furnished directly by or requiring the supervision of inpatient psychiatric facility personnel. In addition, the hospital records show that services furnished were intensive treatment services, admission or related services, or equivalent services.   Signed By: Dasha Swanson MD     November 30, 2021

## 2021-11-30 NOTE — BH NOTES
GROUP THERAPY PROGRESS NOTE    Patient did not participate in Substance abuse/Coping Skills group.      Navdeep Cervantes LPC LSATP [facility]
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
## 2021-11-30 NOTE — BH NOTES
GROUP THERAPY PROGRESS NOTE    Patient is participating in Coping Skills group. Group time: 30 minutes    Personal goal for participation: To understand values and how behaviors impact values    Goal orientation: Personal    Group therapy participation: active    Therapeutic interventions reviewed and discussed: Group discussion of values and how behaviors and actions have been in agreement or disagreement with values. Patients able to share their thoughts on values and value related questions designed for patients to explore their values and beliefs. Impression of participation: Celena Ruiz was present and active in group. His mood was irritable and affect was constricted. He was easily distracted by others and shared his thoughts on some of the questions. He shared that success to him is how things go for him when he dies and is at the bunch. He was appropriate in his interactions with others though struggled with remaining on topic and his train of thought do to the disruptive nature of others in the group.     Edelmira Ruby LPC [facility]

## 2021-11-30 NOTE — GROUP NOTE
JORGE LUIS  GROUP DOCUMENTATION INDIVIDUAL                                                                          Group Therapy Note    Date: 11/30/2021    Group Start Time: 1500  Group End Time: 1600  Group Topic: Recreational/Music Therapy    [facility] [place] [address] ACUTE BEHAV [facility]    [place], [address] [facility] GROUP DOCUMENTATION GROUP    Group Therapy Note    Attendees: 6         Attendance: Attended    Patient's Goal:  To concentrate on selected task    Interventions/techniques: Supported-crafts,games,music    Interactions: Did not interact appropriately    Mental Status: Flat and Preoccupied    Behavior/appearance: Needed prompting    Goals Achieved:        Additional Notes:  Distracted,flat,isolative,declined active participation-sat off to [address]\""
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
## 2021-11-30 NOTE — GROUP NOTE
JORGE LUIS  GROUP DOCUMENTATION INDIVIDUAL                                                                          Group Therapy Note    Date: 11/30/2021    Group Start Time: 1100  Group End Time: 1200  Group Topic: Topic Group    [facility] [place] [address] ACUTE BEHAV [facility]    Les Mckeon [facility] GROUP    Group Therapy Note    Attendees: 5         Attendance: Did not attend    Patient's Goal:      Interventions/techniques:Lela Luna
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
## 2021-11-30 NOTE — BH NOTES
Assumed care of the patient. Patient was isolative to his room. Patient was somewhat interactive and cooperative with the assessments. He denied S.I/H.I/A//VH, anxiety and depression. He said, \"I was dealing with people but they took the wrong way. They ECO'd or TDO'd me what ever. \"    No need or concern was expressed. Patient continued to remain isolative through out the shift. Will continue to provide support as needed. Patient appeared to be sleeping for about 7.5 hours. Labs work completed. Patient was pleasant this morning and interacted well with the staff and peers.

## 2021-11-30 NOTE — PROGRESS NOTES
Problem: Anxiety  Goal: *Alleviation of anxiety  Outcome: Progressing Towards Goal     Problem: Altered Thought Process (Adult/Pediatric)  Goal: *STG: Complies with medication therapy  Outcome: Progressing Towards Goal

## 2021-12-01 PROCEDURE — 65220000003 HC RM SEMIPRIVATE PSYCH

## 2021-12-01 PROCEDURE — 74011250637 HC RX REV CODE- 250/637: Performed by: PSYCHIATRY & NEUROLOGY

## 2021-12-01 RX ORDER — LANOLIN ALCOHOL/MO/W.PET/CERES
3 CREAM (GRAM) TOPICAL
Status: DISCONTINUED | OUTPATIENT
Start: 2021-12-01 | End: 2021-12-02 | Stop reason: HOSPADM

## 2021-12-01 RX ORDER — LANOLIN ALCOHOL/MO/W.PET/CERES
3 CREAM (GRAM) TOPICAL EVERY EVENING
Status: DISCONTINUED | OUTPATIENT
Start: 2021-12-01 | End: 2021-12-01

## 2021-12-01 RX ADMIN — HYDROXYZINE HYDROCHLORIDE 50 MG: 25 TABLET, FILM COATED ORAL at 14:57

## 2021-12-01 NOTE — PROGRESS NOTES
Pt is calm, cooperative and appropriate when conversing. Pt has commented that he likes the aspect to prn medication to anxiety as he does not feel like he experiences anxiety frequently or severely enough to take something scheduled to help control it. Pt has requested prn atarax which was effective. Pt denies SI, HI, AH and VH.

## 2021-12-01 NOTE — BH NOTES
GROUP THERAPY PROGRESS NOTE    Patient is participating in Coping Skills group. Group time: 45 minutes    Personal goal for participation: To process previous and current support systems and establish concrete ways to access support systems when needed    Goal orientation: Personal    Group therapy participation: active    Therapeutic interventions reviewed and discussed: Patients completed eco mapping activity to process previous and current support systems. Patients discussed ways in which their social supports have changed, how to ask for help when needed, emotions associated with asking for help, and ways to change their social supports to best aid them following discharge. Patients processed setting appropriate boundaries with social supports and identified coping skills to complete with support system. Impression of participation: Pt with labile mood, loud and pressured speech, easily redirected. Pt stated \"Why do I need support? Why can't I just do me. I don't need anyone to hold my hand. \" Pts point of view appeared to change throughout group session, pt later processed desire for his mother to listen to his needs instead of trying to fix things. Pt able to complete activity but declined to share with group session.     RENEE Rodriguez

## 2021-12-01 NOTE — PROGRESS NOTES
Patient participated in [address] [place] [address] on 12/1/2021. Rev.  Michele Cohen MDiv, [facility], [facility] paging service: [phone])

## 2021-12-01 NOTE — PROGRESS NOTES
1086-8900: Rodolfo Ruvalcaba presented alert and oriented, broad affect, mood euthymic. He was noted in the dayroom watching tv with his peers. 9478-4925: Pt noted in dayroom watching tv with peers. 2641-0145: Pt resting quietly in bed. No distress observed. 4178-0032: Pt resting quietly in bed. No distress observed. 5385-1017: Pt resting quietly in bed. No distress observed. 3109-5376: Pt slept approx 8 hours during the night. No distress observed. Monitoring for safety continues.
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
## 2021-12-01 NOTE — GROUP NOTE
JORGE LUIS  GROUP DOCUMENTATION INDIVIDUAL                                                                          Group Therapy Note    Date: 12/1/2021    Group Start Time: 1000  Group End Time: 1100  Group Topic: Topic Group    [facility] [address] ACUTE BEHAV [facility]    [place], [address] [facility] GROUP DOCUMENTATION GROUP    Group Therapy Note    Attendees: 4         Attendance: Attended    Patient's Goal:  To identify positive and negative coping skills    Interventions/techniques: Supported-discussion    Interactions: Disorganized interaction    Mental Status: Labile    Behavior/appearance: Needed prompting    Goals Achieved:  Attended group session      Additional Notes:  Mood changes,tearful at times-writer offered support-pt reported he needed time alone,left session early    Skyler Kelley

## 2021-12-01 NOTE — PROGRESS NOTES
Problem: Manic Behavior (Adult/Pediatric)  Goal: *STG: Remains safe in hospital  Outcome: Progressing Towards Goal

## 2021-12-01 NOTE — GROUP NOTE
JORGE LUIS  GROUP DOCUMENTATION INDIVIDUAL                                                                          Group Therapy Note    Date: 12/1/2021    Group Start Time: 1400  Group End Time: 1500  Group Topic: Recreational/Music Therapy    [facility] [place] [address] ACUTE BEHAV [facility]    [place], [address] [facility] GROUP DOCUMENTATION GROUP    Group Therapy Note    Attendees: 8         Attendance: Attended    Patient's Goal:  To concentrate on selected task    Interventions/techniques: Supported-crafts,games,music    Interactions: Disorganized interaction    Mental Status: Labile    Behavior/appearance: Needed prompting    Goals Achieved:  Attended group session      Additional Notes:  Mood changes-declined active participation-sat off to himself    Hilda Anderson

## 2021-12-01 NOTE — PROGRESS NOTES
Problem: Altered Thought Process (Adult/Pediatric)  Goal: *STG: Participates in treatment plan  Outcome: Progressing Towards Goal  Goal: *STG: Remains safe in hospital  Outcome: Progressing Towards Goal  Goal: *STG: Seeks staff when feelings of anxiety and fear arise  Outcome: Progressing Towards Goal  Goal: *STG: Attends activities and groups  Outcome: Progressing Towards Goal
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
## 2021-12-01 NOTE — BH NOTES
Psychiatric Progress Note    Patient: Mela Su MRN: [id number]  SSN: [id number]    YOB: 1980  Age: 46 y.o. Sex: male      Admit Date: 11/29/2021    LOS: 2 days     Subjective:     Mela Su  reports feeling much better and moods are upset. States having bills to pay and debts to fulfill. Denies SI/HI/AH/VH. No aggression or violence. Appropriately interactive and aware. Tolerating medications well. Eating well and sleeping well. Waiting his day in court in order to return to his life. 12/1 - Magaly Durand Marychuyedgar reports feeling well and moods are good. Denies SI/HI/AH/VH. No aggression or violence. Appropriately interactive and aware. Tolerating medications well. Eating and sleeping fairly.       Objective:     Vitals:    11/29/21 1944 11/30/21 0826 11/30/21 2017 12/01/21 0812   BP: 133/64 137/82 136/80 (!) 143/66   Pulse: (!) 53 83 66 74   Resp: 15 18 18 16   Temp: 98.7 °F (37.1 °C) 98 °F (36.7 °C) 98.6 °F (37 °C) 98.4 °F (36.9 °C)   SpO2:  100% 100% 100%        Mental Status Exam:   Sensorium  oriented to time, place and person   Relations cooperative   Eye Contact appropriate   Appearance:  age appropriate   Speech:  normal volume and non-pressured   Thought Process: goal directed   Thought Content free of hallucinations   Suicidal ideations none   Mood:  depressed   Affect:  Fair range   Memory   limited   Concentration:  adequate   Insight:  limited   Judgment:  fair       MEDICATIONS:  Current Facility-Administered Medications   Medication Dose Route Frequency    OLANZapine (ZyPREXA) tablet 5 mg  5 mg Oral Q6H PRN    haloperidol lactate (HALDOL) injection 5 mg  5 mg IntraMUSCular Q6H PRN    benztropine (COGENTIN) tablet 1 mg  1 mg Oral BID PRN    diphenhydrAMINE (BENADRYL) injection 50 mg  50 mg IntraMUSCular BID PRN    hydrOXYzine HCL (ATARAX) tablet 50 mg  50 mg Oral TID PRN    LORazepam (ATIVAN) injection 1 mg  1 mg IntraMUSCular Q4H PRN    traZODone (DESYREL) tablet 50 mg  50 mg Oral QHS PRN    acetaminophen (TYLENOL) tablet 650 mg  650 mg Oral Q4H PRN    magnesium hydroxide (MILK OF MAGNESIA) 400 mg/5 mL oral suspension 30 mL  30 mL Oral DAILY PRN      DISCUSSION:   the risks and benefits of the proposed medication  patient given opportunity to ask questions    Lab/Data Review: All lab results for the last 24 hours reviewed. No results found for this or any previous visit (from the past 24 hour(s)). Assessment:     Principal Problem:    Bipolar disorder, current episode manic, severe with psychotic features ([facility] [address] (11/29/2021)        Plan:     Continue current care  Collateral information  Medication modification as appropriate  Court hearing is today  Disposition planning with social work    I certify that this patient's inpatient psychiatric hospital services furnished since the previous certification were, and continue to be, required for treatment that could reasonably be expected to improve the patient's condition, or for diagnostic study, and that the patient continues to need, on a daily basis, active treatment furnished directly by or requiring the supervision of inpatient psychiatric facility personnel. In addition, the hospital records show that services furnished were intensive treatment services, admission or related services, or equivalent services.   Signed By: Sarita Macias MD     December 1, 2021

## 2021-12-01 NOTE — BH NOTES
1300-Patient met wit courts for TDO hearing and ws allow to volunteer to stay at the hospital x 72 hours.

## 2021-12-02 VITALS
RESPIRATION RATE: 16 BRPM | TEMPERATURE: 99.2 F | DIASTOLIC BLOOD PRESSURE: 83 MMHG | OXYGEN SATURATION: 100 % | SYSTOLIC BLOOD PRESSURE: 148 MMHG | HEART RATE: 73 BPM

## 2021-12-02 PROCEDURE — 74011250637 HC RX REV CODE- 250/637: Performed by: PSYCHIATRY & NEUROLOGY

## 2021-12-02 RX ORDER — HYDROXYZINE 50 MG/1
50 TABLET, FILM COATED ORAL
Qty: 30 TABLET | Refills: 0 | Status: SHIPPED | OUTPATIENT
Start: 2021-12-02 | End: 2021-12-12

## 2021-12-02 RX ADMIN — HYDROXYZINE HYDROCHLORIDE 50 MG: 25 TABLET, FILM COATED ORAL at 05:48

## 2021-12-02 RX ADMIN — ACETAMINOPHEN 650 MG: 325 TABLET ORAL at 05:48

## 2021-12-02 NOTE — PROGRESS NOTES
Problem: Anxiety  Goal: *Alleviation of anxiety  Outcome: Progressing Towards Goal  Goal: *Alleviation of anxiety (Palliative Care)  Outcome: Progressing Towards Goal       1500    Patient alert and verbal. Discharged to aunt's home to continue recommended plan of care. Discharge instructions reviewed with the pt. Patient verbalized understanding. Patients belongings were returned. Patient ambulated with [address] staff member to the ED entrance to his ride home. Pt denies si/hi/ah/vh and is Aox4.

## 2021-12-02 NOTE — PROGRESS NOTES
Problem: Altered Thought Process (Adult/Pediatric)  Goal: *STG: Participates in treatment plan  Outcome: Progressing Towards Goal  Goal: *STG: Seeks staff when feelings of anxiety and fear arise  Outcome: Progressing Towards Goal  Goal: *STG: Demonstrates ability to understand and use improved judgment in daily activities and relationships  Outcome: Progressing Towards Goal     6580-6060: Assumed care of patient after receiving shift report from outgoing nurse. Patient was out and visible on unit, interacting appropriately with peers and staff. Affect is smiling, mood is anxious. Pt cooperative with vitals and assessment. A&O x 4. Independent in ADLs. Insight and concentration present. Patient reports mild anxiety at this time but declines pharmaceutical intervention and instead reported trying meditation to alleviate anxiety. Gait is steady. Appetite patterns WNL. Denies AVH/SI/HI. Pt denies pain. Patient medication and diet compliant. Patient in day room interacting appropriately with peers and engaging in encouraging conversation. Pt encouraged to continue to participate in care. 0853-3086: Patient resting in bed at this time. 5222-0550: Patient ambulated to nurses station complaining of anxiety and Left shoulder pain. PRN atarax and tylenol given. Heat applied as well. Will monitor patient through shift and communicate to oncoming nurse. No further issues noted at this time. 9095-6586: Pt has been observed throughout the night. Total hours slept approximately 6. No s/s of distress noted. Patient has remained safe.

## 2021-12-02 NOTE — BH NOTES
Behavioral Health Treatment Team Note      Patient goal(s) for today: continue taking meds as prescribe, engage in unit activities, participate in hygiene/ADLs, prepare for discharge, follow directions from staff, contact support team  Treatment team focus/goals: continue adjusting meds as needed, discharge planning, update support system     Progress note: Patient reports the court stuff was stressful. On the fence about leaving today or tomorrow. Thinks he might need some more time to digest and recover. Processed about events that led up to admission. Concerned that he needs to stay but is increasing his stress by staying. Patient allowed to weigh the pros and cons. Plans to call his aunt, knows where his truck is, needs to talk to his boss. Strategized about coping skills, staying calm. Discharge plan reviewed and safety plan completed.  Patient to discharge this evening once he talks to his aunt.      LOS:  3                       Expected LOS: 3     Insurance info/prescription coverage:  Self Pay  TDO (will refer to [employer] and request [facility] following the TDO Hearing)  Date of last family contact:  No RODO  Family requesting physician contact today:  no  Discharge plan:  TBD  Guns in the home:  no   Outpatient provider(s):  To be linked     Participating treatment team members: RENEE Arrieta, Cortes Smith MD

## 2021-12-02 NOTE — DISCHARGE SUMMARY
PSYCHIATRIC DISCHARGE SUMMARY         IDENTIFICATION:    Patient Name  Blanco Salazar   Date of Birth 1980   [facility] [id number]   Medical Record Number  [id number]      Age  46 y.o. PCP None   Admit date:  11/29/2021    Discharge date: 12/2/2021   Room Number  308/01  @ [facility]   Date of Service  12/2/2021            TYPE OF DISCHARGE: REGULAR               CONDITION AT DISCHARGE: improved       PROVISIONAL & DISCHARGE DIAGNOSES:    Problem List  Never Reviewed          Codes Class    * (Principal) Bipolar disorder, current episode manic, severe with psychotic features [facility] ICD-10-CM: F31.2  ICD-9-CM: 296.44               Active Hospital Problems    *Bipolar disorder, current episode manic, severe with psychotic features ([facility]        DISCHARGE DIAGNOSIS:   Axis I:  SEE ABOVE  Axis II: SEE ABOVE  Axis III: SEE ABOVE  Axis IV:  lack of structure  Axis V:  <50 on admission, 55+ on discharge     CC & HISTORY OF PRESENT ILLNESS:  71-year-old  male with a history of psychosis and schizophrenia. The family called the 's office due to the patient's behaviors and thought that he has not been on medications, running around slashing pumpkins and authorities saw him wielding a knife in the air. He states he was driving down the road and the traffic got too much, so he pulled off to the side to pray. He is still in that state, not quiet at all. He denies suicidal or homicidal ideations and no auditory or visual hallucinations. He has been hospitalized multiple times for various reasons. He has been put on meds,he does not really like meds, does not want to be put on them, does not think he needs them. 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 He was not trying to hurt himself or anyone else. He was trying to help them all. He does understand that all this translates to hospitalization.   He has done nothing wrong or aggressive or dangerous to anyone or himself. SOCIAL HISTORY:    Social History     Socioeconomic History    Marital status: SINGLE     Spouse name: Not on file    Number of children: Not on file    Years of education: Not on file    Highest education level: Not on file   Occupational History    Not on file   Tobacco Use    Smoking status: Current Every Day Smoker    Smokeless tobacco: Not on file   Substance and Sexual Activity    Alcohol use: Yes    Drug use: Not on file    Sexual activity: Not on file   Other Topics Concern    Not on file   Social History Narrative    Not on file     Social Determinants of Health     Financial Resource Strain:     Difficulty of Paying Living Expenses: Not on file   Food Insecurity:     Worried About Running Out of Food in the Last Year: Not on file    Ericka of Food in the Last Year: Not on file   Transportation Needs:     Lack of Transportation (Medical): Not on file    Lack of Transportation (Non-Medical): Not on file   Physical Activity:     Days of Exercise per Week: Not on file    Minutes of Exercise per Session: Not on file   Stress:     Feeling of Stress : Not on file   Social Connections:     Frequency of Communication with Friends and Family: Not on file    Frequency of Social Gatherings with Friends and Family: Not on file    Attends Jain Services: Not on file    Active Member of [address] [place] [address] [employer] or Organizations: Not on file    Attends Club or Organization Meetings: Not on file    Marital Status: Not on file   Intimate Partner Violence:     Fear of Current or Ex-Partner: Not on file    Emotionally Abused: Not on file    Physically Abused: Not on file    Sexually Abused: Not on file   Housing Stability:     Unable to Pay for Housing in the Last Year: Not on file    Number of [place] in the Last Year: Not on file    Unstable Housing in the Last Year: Not on file      FAMILY HISTORY:   No family history on file.           HOSPITALIZATION COURSE:    Heather Thorne was admitted to the inpatient psychiatric unit [facility] for acute psychiatric stabilization in regards to symptomatology as described in the HPI above. The differential diagnosis at time of admission included: schizophrenia vs substance induced psychotic disorder schizoaffective vs bipolar vs adjustment disorder. While on the unit Michelle Alfredo was involved in individual, group, occupational and milieu therapy. Psychiatric medications were adjusted during this hospitalization. Michelle Alfredo demonstrated a progressive improvement in overall condition. Much of patient's initial presentation appeared to be related to situational stressors, effects of medication non-compliance drugs of abuse, and psychological factors. Please see individual progress notes for more specific details regarding patient's hospitalization course. Michelle Alfredo reports feeling well and moods are good. Denies SI/HI/AH/VH. No aggression or violence. Appropriately interactive and aware. Tolerating medications well. Eating and sleeping fairly. Patient with request for discharge today. There are no grounds to seek a TDO. At time of discharge, Michelle Alfredo is without significant problems of depression, psychosis, or rosmery. Patient free of suicidal and homicidal ideations (appears to be at very low risk of suicide or homicide) and reports many positive predictive factors in terms of not attempting suicide or homicide. Overall presentation at time of discharge is most consistent with the diagnosis of Adjustment disorder mixed features. Patient has maximized benefit to be derived from acute inpatient psychiatric treatment. All members of the treatment team concur with each other in regards to plans for discharge today. Patient and family are aware and in agreement with discharge and discharge plan.          LABS AND IMAGAING:    Labs Reviewed   CBC W/O DIFF - Abnormal; Notable for the following components: Result Value    .4 (*)     MCH 34.2 (*)     RDW 11.3 (*)     All other components within normal limits   TSH 3RD GENERATION   LIPID PANEL   METABOLIC PANEL, COMPREHENSIVE     No results found for: DS35, PHEN, PHENO, PHENT, DILF, DS39, PHENY, PTN, VALF2, VALAC, VALP, VALPR, DS6, CRBAM, CRBAMP, CARB2, XCRBAM  Admission on 11/29/2021   Component Date Value Ref Range Status    WBC 11/30/2021 7.0  4.1 - 11.1 K/uL Final    RBC 11/30/2021 4.39  4. 10 - 5.70 M/uL Final    HGB 11/30/2021 15.0  12.1 - 17.0 g/dL Final    HCT 11/30/2021 44.5  36.6 - 50.3 % Final    MCV 11/30/2021 101.4* 80.0 - 99.0 FL Final    MCH 11/30/2021 34.2* 26.0 - 34.0 PG Final    MCHC 11/30/2021 33.7  30.0 - 36.5 g/dL Final    RDW 11/30/2021 11.3* 11.5 - 14.5 % Final    PLATELET 77/69/4973 076  150 - 400 K/uL Final    MPV 11/30/2021 10.7  8.9 - 12.9 FL Final    NRBC 11/30/2021 0.0  0  WBC Final    ABSOLUTE NRBC 11/30/2021 0.00  0.00 - 0.01 K/uL Final    TSH 11/30/2021 1.00  0.36 - 3.74 uIU/mL Final    Cholesterol, total 11/30/2021 99  <200 MG/DL Final    Triglyceride 11/30/2021 69  <150 MG/DL Final    HDL Cholesterol 11/30/2021 46  MG/DL Final    LDL, calculated 11/30/2021 39.2  0 - 100 MG/DL Final    VLDL, calculated 11/30/2021 13.8  MG/DL Final    CHOL/HDL Ratio 11/30/2021 2.2  0.0 - 5.0   Final    Sodium 11/30/2021 140  136 - 145 mmol/L Final    Potassium 11/30/2021 4.3  3.5 - 5.1 mmol/L Final    Chloride 11/30/2021 106  97 - 108 mmol/L Final    CO2 11/30/2021 28  21 - 32 mmol/L Final    Anion gap 11/30/2021 6  5 - 15 mmol/L Final    Glucose 11/30/2021 95  65 - 100 mg/dL Final    BUN 11/30/2021 14  6 - 20 MG/DL Final    Creatinine 11/30/2021 1.13  0.70 - 1.30 MG/DL Final    BUN/Creatinine ratio 11/30/2021 12  12 - 20   Final    GFR est AA 11/30/2021 >60  >60 ml/min/1.73m2 Final    GFR est non-AA 11/30/2021 >60  >60 ml/min/1.73m2 Final    Calcium 11/30/2021 9.0  8.5 - 10.1 MG/DL Final    Bilirubin, total 11/30/2021 0.7  0.2 - 1.0 MG/DL Final    ALT (SGPT) 11/30/2021 41  12 - 78 U/L Final    AST (SGOT) 11/30/2021 36  15 - 37 U/L Final    Alk. phosphatase 11/30/2021 81  45 - 117 U/L Final    Protein, total 11/30/2021 7.0  6.4 - 8.2 g/dL Final    Albumin 11/30/2021 4.3  3.5 - 5.0 g/dL Final    Globulin 11/30/2021 2.7  2.0 - 4.0 g/dL Final    A-G Ratio 11/30/2021 1.6  1.1 - 2.2   Final   Admission on 11/28/2021, Discharged on 11/29/2021   Component Date Value Ref Range Status    WBC 11/28/2021 10.4  4.1 - 11.1 K/uL Final    RBC 11/28/2021 3.98* 4.10 - 5.70 M/uL Final    HGB 11/28/2021 13.5  12.1 - 17.0 g/dL Final    HCT 11/28/2021 38.7  36.6 - 50.3 % Final    MCV 11/28/2021 97.2  80.0 - 99.0 FL Final    MCH 11/28/2021 33.9  26.0 - 34.0 PG Final    MCHC 11/28/2021 34.9  30.0 - 36.5 g/dL Final    RDW 11/28/2021 11.3* 11.5 - 14.5 % Final    PLATELET 64/15/9713 188* 150 - 400 K/uL Final    MPV 11/28/2021 9.9  8.9 - 12.9 FL Final    NRBC 11/28/2021 0.0  0  WBC Final    ABSOLUTE NRBC 11/28/2021 0.00  0.00 - 0.01 K/uL Final    NEUTROPHILS 11/28/2021 75  32 - 75 % Final    LYMPHOCYTES 11/28/2021 15  12 - 49 % Final    MONOCYTES 11/28/2021 10  5 - 13 % Final    EOSINOPHILS 11/28/2021 0  0 - 7 % Final    BASOPHILS 11/28/2021 0  0 - 1 % Final    IMMATURE GRANULOCYTES 11/28/2021 0  0.0 - 0.5 % Final    ABS. NEUTROPHILS 11/28/2021 7.8  1.8 - 8.0 K/UL Final    ABS. LYMPHOCYTES 11/28/2021 1.6  0.8 - 3.5 K/UL Final    ABS. MONOCYTES 11/28/2021 1.0  0.0 - 1.0 K/UL Final    ABS. EOSINOPHILS 11/28/2021 0.0  0.0 - 0.4 K/UL Final    ABS. BASOPHILS 11/28/2021 0.0  0.0 - 0.1 K/UL Final    ABS. IMM.  GRANS. 11/28/2021 0.0  0.00 - 0.04 K/UL Final    DF 11/28/2021 AUTOMATED    Final    Sodium 11/28/2021 140  136 - 145 mmol/L Final    Potassium 11/28/2021 3.6  3.5 - 5.1 mmol/L Final    Chloride 11/28/2021 105  97 - 108 mmol/L Final    CO2 11/28/2021 25  21 - 32 mmol/L Final    Anion gap 11/28/2021 10  5 - 15 mmol/L Final    Glucose 11/28/2021 101* 65 - 100 mg/dL Final    BUN 11/28/2021 17  6 - 20 MG/DL Final    Creatinine 11/28/2021 1.28  0.70 - 1.30 MG/DL Final    BUN/Creatinine ratio 11/28/2021 13  12 - 20   Final    GFR est AA 11/28/2021 >60  >60 ml/min/1.73m2 Final    GFR est non-AA 11/28/2021 >60  >60 ml/min/1.73m2 Final    Calcium 11/28/2021 9.3  8.5 - 10.1 MG/DL Final    Bilirubin, total 11/28/2021 1.2* 0.2 - 1.0 MG/DL Final    ALT (SGPT) 11/28/2021 36  12 - 78 U/L Final    AST (SGOT) 11/28/2021 48* 15 - 37 U/L Final    Alk.  phosphatase 11/28/2021 68  45 - 117 U/L Final    Protein, total 11/28/2021 6.7  6.4 - 8.2 g/dL Final    Albumin 11/28/2021 3.8  3.5 - 5.0 g/dL Final    Globulin 11/28/2021 2.9  2.0 - 4.0 g/dL Final    A-G Ratio 11/28/2021 1.3  1.1 - 2.2   Final    ALCOHOL(ETHYL),SERUM 11/28/2021 <10  <10 MG/DL Final    Salicylate level 21/15/1408 4.5  2.8 - 20.0 MG/DL Final    Acetaminophen level 11/28/2021 <2* 10 - 30 ug/mL Final    AMPHETAMINES 11/28/2021 Negative  NEG   Final    BARBITURATES 11/28/2021 Negative  NEG   Final    BENZODIAZEPINES 11/28/2021 Negative  NEG   Final    COCAINE 11/28/2021 Negative  NEG   Final    METHADONE 11/28/2021 Negative  NEG   Final    OPIATES 11/28/2021 Negative  NEG   Final    PCP(PHENCYCLIDINE) 11/28/2021 Negative  NEG   Final    THC (TH-CANNABINOL) 11/28/2021 Positive* NEG   Final    Drug screen comment 11/28/2021 (NOTE)   Final    Color 11/28/2021 DARK YELLOW    Final    Appearance 11/28/2021 CLEAR  CLEAR   Final    Specific gravity 11/28/2021 1.027  1.003 - 1.030   Final    pH (UA) 11/28/2021 5.5  5.0 - 8.0   Final    Protein 11/28/2021 30* NEG mg/dL Final    Glucose 11/28/2021 Negative  NEG mg/dL Final    Ketone 11/28/2021 40* NEG mg/dL Final    Blood 11/28/2021 Negative  NEG   Final    Urobilinogen 11/28/2021 1.0  0.2 - 1.0 EU/dL Final    Nitrites 11/28/2021 Negative  NEG   Final    Leukocyte Esterase 11/28/2021 Negative  NEG Final    WBC 11/28/2021 0-4  0 - 4 /hpf Final    RBC 11/28/2021 0-5  0 - 5 /hpf Final    Epithelial cells 11/28/2021 FEW  FEW /lpf Final    Bacteria 11/28/2021 Negative  NEG /hpf Final    UA:UC IF INDICATED 11/28/2021 CULTURE NOT INDICATED BY UA RESULT  CNI   Final    Hyaline cast 11/28/2021 2-5  0 - 5 /lpf Final    Specimen source 11/28/2021 Nasopharyngeal    Final    COVID-19 rapid test 11/28/2021 Not detected  NOTD   Final    Bilirubin UA, confirm 11/28/2021 Negative  NEG   Final     No results found. DISPOSITION:    Home. Patient to f/u with drug/etoh rehabilitation, psychiatric, and psychotherapy appointments. Patient is to f/u with internist as directed. FOLLOW-UP CARE:    Activity as tolerated  Regular diet  Wound Care: none needed. Follow-up Information     Follow up With Specialties Details Why Contact Info    None    None ([phone]) Patient stated that they have no PCP                   PROGNOSIS:   Fair ---- based on nature of patient's pathology/ies and treatment compliance issues. Prognosis is greatly dependent upon patient's ability to remain sober and to follow up with scheduled appointments as well as to comply with psychiatric medications as prescribed. DISCHARGE MEDICATIONS:     Informed consent given for the use of following psychotropic medications:  Current Discharge Medication List      START taking these medications    Details   hydrOXYzine HCL (ATARAX) 50 mg tablet Take 1 Tablet by mouth three (3) times daily as needed for Anxiety for up to 10 days. Indications: anxious  Qty: 30 Tablet, Refills: 0  Start date: 12/2/2021, End date: 12/12/2021                    A coordinated, multidisplinary treatment team round was conducted with Koko Flowers is done daily here at [facility]. This team consists of the nurse, psychiatric unit pharmacist,  and writer.      I have spent greater than 35 minutes on discharge work.    Signed:  Toshia Prabhakar MD  12/2/2021

## 2021-12-02 NOTE — PROGRESS NOTES
Pharmacist Discharge Medication Reconciliation    Discharging Provider: Dr. Lucas Maurer PMH:   Past Medical History:   Diagnosis Date    Psychiatric disorder      Chief Complaint for this Admission: No chief complaint on file. Allergies: Patient has no known allergies. Discharge Medications:   Current Discharge Medication List        START taking these medications    Details   hydrOXYzine HCL (ATARAX) 50 mg tablet Take 1 Tablet by mouth three (3) times daily as needed for Anxiety for up to 10 days.  Indications: anxious  Qty: 30 Tablet, Refills: 0  Start date: 12/2/2021, End date: 12/12/2021             The patient's chart, MAR and AVS were reviewed by Smith Escamilla PHARMD, BCPS

## 2021-12-02 NOTE — DISCHARGE INSTRUCTIONS
DISCHARGE SUMMARY    NAME:Yuri Gonzalez  : 1980  MRN: [id number]    The patient Brandee Rodrigez exhibits the ability to control behavior in a less restrictive environment. Patient's level of functioning is improving. No assaultive/destructive behavior has been observed for the past 24 hours. No suicidal/homicidal threat or behavior has been observed for the past 24 hours. There is no evidence of serious medication side effects. Patient has not been in physical or protective restraints for at least the past 24 hours. If weapons involved, how are they secured? No weapons    Is patient aware of and in agreement with discharge plan? yes    Arrangements for medication:  Prescriptions sent to preferred pharmacy    Copy of discharge instructions to provider?:  yes    Arrangements for transportation home:  Aunt to transport    Keep all follow up appointments as scheduled, continue to take prescribed medications per physician instructions.   Mental health crisis number:  [phone] or your local mental health crisis line number at [place] [address] Emergency WARM LINE      [phone] ([phone])      M-F: 9am to 9pm      Sat & Sun: [address] [facility] suicide prevention lines:                             [phone] ([phone])       [phone] ([phone])    Crisis Text Line:  Text HOME to [phone]

## 2021-12-02 NOTE — GROUP NOTE
JORGE LUIS  GROUP DOCUMENTATION INDIVIDUAL                                                                          Group Therapy Note    Date: 12/2/2021    Group Start Time: 1000  Group End Time: 1100  Group Topic: Topic Group    [facility] 3 ACUTE BEHAV [facility]    [place], [address] [facility] GROUP DOCUMENTATION GROUP    Group Therapy Note    Attendees: 5         Attendance: Attended    Patient's Goal:  To participate in stress management [employer] game    Interventions/techniques: Supported-things pertaining to stress    Follows Directions:  Followed directions    Interactions: Interacted appropriately    Mental Status: Calm    Behavior/appearance: Attentive, Cooperative and Needed prompting    Goals Achieved: Able to engage in interactions, Able to listen to others and Discussed coping    Sarita Carroll

## 2021-12-02 NOTE — BH NOTES
GROUP THERAPY PROGRESS NOTE    Patient is participating in Coping Skills group. Group time: 30 minutes    Personal goal for participation: To practice naming emotions and processing memories of emotions. Goal orientation: Personal    Group therapy participation: active    Therapeutic interventions reviewed and discussed: Group members worked on a word search of feeling words/emotions. After a they had time to find some of the words patients were asked to read the list of emotions/feeling words and share which word they reacted to the most. Each member was able to share a memory or statement they try to remember for each word. Impression of participation: Erika De La Cruz shared that he is going home today so that he can work on some things that he has to address outside of the hospital. He reports being ready though a little anxious. He was observed working quickly on the word search and had a friendly competition with another patient trying to see who could find words the fastest. He completed his word search by the end of the group and handed it to this writer with some additional words that he found in the puzzle.     Mike Lechuga LPC LSATP [facility]

## 2021-12-03 NOTE — BH NOTES
Behavioral Health Transition Record to Provider    Patient Name: Ann Starks  YOB: 1980  Medical Record Number: [id number]  Date of Admission: 11/29/2021  Date of Discharge: 12/2/21    Attending Provider: No att. providers found  Discharging Provider: Yann Ling MD  To contact this individual call [phone] and ask the  to page. If unavailable, ask to be transferred to [facility] Provider on call. [facility] Provider will be available on call 24/7 and during holidays. Primary Care Provider: None    No Known Allergies    Reason for Admission: per chart review, patient [place] [address], preaching to and stabbing pumpkins. He has reportedly been without medications for at least two months. Initial presentation was labile mood and attitude, grew increasingly agitated. In the hospital, he was largely uncooperative with some verbalized aggression toward staff. He endorsed AH (God speaks to him). Patient reports there was traffic going [address]. Pulled off to the side of the road. He was \"doing something\" and \"talking to someone, we can call him God\". People may have thought he was directing it toward someone else. Denied SI/HI, AVH. Asked about pumpkins and knife- he reports \"it was about something else\". Reports he's here to help because his mission never ends. Reports his mother likes him on medicine but he doesn't need them. Admission Diagnosis: Bipolar disorder, current episode manic, severe with psychotic features ([address] [facility] [F31.2]    * No surgery found *    Results for orders placed or performed during the hospital encounter of 11/29/21   CBC W/O DIFF   Result Value Ref Range    WBC 7.0 4.1 - 11.1 K/uL    RBC 4.39 4. 10 - 5.70 M/uL    HGB 15.0 12.1 - 17.0 g/dL    HCT 44.5 36.6 - 50.3 %    .4 (H) 80.0 - 99.0 FL    MCH 34.2 (H) 26.0 - 34.0 PG    MCHC 33.7 30.0 - 36.5 g/dL    RDW 11.3 (L) 11.5 - 14.5 %    PLATELET 825 481 - 745 K/uL MPV 10.7 8.9 - 12.9 FL    NRBC 0.0 0  WBC    ABSOLUTE NRBC 0.00 0.00 - 0.01 K/uL   TSH 3RD GENERATION   Result Value Ref Range    TSH 1.00 0.36 - 3.74 uIU/mL   LIPID PANEL   Result Value Ref Range    Cholesterol, total 99 <200 MG/DL    Triglyceride 69 <150 MG/DL    HDL Cholesterol 46 MG/DL    LDL, calculated 39.2 0 - 100 MG/DL    VLDL, calculated 13.8 MG/DL    CHOL/HDL Ratio 2.2 0.0 - 5.0     METABOLIC PANEL, COMPREHENSIVE   Result Value Ref Range    Sodium 140 136 - 145 mmol/L    Potassium 4.3 3.5 - 5.1 mmol/L    Chloride 106 97 - 108 mmol/L    CO2 28 21 - 32 mmol/L    Anion gap 6 5 - 15 mmol/L    Glucose 95 65 - 100 mg/dL    BUN 14 6 - 20 MG/DL    Creatinine 1.13 0.70 - 1.30 MG/DL    BUN/Creatinine ratio 12 12 - 20      GFR est AA >60 >60 ml/min/1.73m2    GFR est non-AA >60 >60 ml/min/1.73m2    Calcium 9.0 8.5 - 10.1 MG/DL    Bilirubin, total 0.7 0.2 - 1.0 MG/DL    ALT (SGPT) 41 12 - 78 U/L    AST (SGOT) 36 15 - 37 U/L    Alk. phosphatase 81 45 - 117 U/L    Protein, total 7.0 6.4 - 8.2 g/dL    Albumin 4.3 3.5 - 5.0 g/dL    Globulin 2.7 2.0 - 4.0 g/dL    A-G Ratio 1.6 1.1 - 2.2         Immunizations administered during this encounter: There is no immunization history on file for this patient. Screening for Metabolic Disorders for Patients on Antipsychotic Medications  (Data obtained from the EMR)    Estimated Body Mass Index  Estimated body mass index is 24.39 kg/m² as calculated from the following:    Height as of 11/28/21: 5' 10\" (1.778 m). Weight as of 11/28/21: 77.1 kg (170 lb).      Vital Signs/Blood Pressure  Visit Vitals  BP (!) 148/83   Pulse 73   Temp 99.2 °F (37.3 °C)   Resp 16   SpO2 100%       Blood Glucose/Hemoglobin A1c  Lab Results   Component Value Date/Time    Glucose 95 11/30/2021 06:10 AM       No results found for: HBA1C, [id number]     Lipid Panel  Lab Results   Component Value Date/Time    Cholesterol, total 99 11/30/2021 06:10 AM    HDL Cholesterol 46 11/30/2021 06:10 AM    LDL, calculated 39.2 11/30/2021 06:10 AM    Triglyceride 69 11/30/2021 06:10 AM    CHOL/HDL Ratio 2.2 11/30/2021 06:10 AM        Discharge Diagnosis: please refer to physician's discharge summary    Discharge Plan: The patient Salbador Butler exhibits the ability to control behavior in a less restrictive environment. Patient's level of functioning is improving. No assaultive/destructive behavior has been observed for the past 24 hours. No suicidal/homicidal threat or behavior has been observed for the past 24 hours. There is no evidence of serious medication side effects. Patient has not been in physical or protective restraints for at least the past 24 hours. If weapons involved, how are they secured? No weapons    Is patient aware of and in agreement with discharge plan? yes    Arrangements for medication:  Prescriptions sent to preferred pharmacy    Copy of discharge instructions to provider?:  yes    Arrangements for transportation home:  Aunt to transport    Keep all follow up appointments as scheduled, continue to take prescribed medications per physician instructions. Mental health crisis number:  [phone] or your local mental health crisis line number at [phone]        Discharge Medication List and Instructions:   Discharge Medication List as of 12/2/2021 11:12 AM      START taking these medications    Details   hydrOXYzine HCL (ATARAX) 50 mg tablet Take 1 Tablet by mouth three (3) times daily as needed for Anxiety for up to 10 days.  Indications: anxious, Normal, Disp-30 Tablet, R-0             Unresulted Labs (24h ago, onward)            None        To obtain results of studies pending at discharge, please contact n/a    Follow-up Information     Follow up With Specialties Details Why Contact Info    None    None ([phone]) Patient stated that they have no PCP      [facility]  Call today Please go to [facility] Walk in intake appointments between 9:00AM and 2:00PM (Mon-Fri) to begin process of receiving mental health medication, case management and therapy services. [address]  [address] [facility]  Call Please contact if experiencing a housing emergency [phone]  Call Please contact for information on community resources [employer]  Please dial [address] to Please go to the shelter if need of housing to check in at 7:00PM. They will provide bed, meals during your stay. [address]          Advanced Directive:   Does the patient have an appointed surrogate decision maker? Yes  Does the patient have a Medical Advance Directive? No  Does the patient have a Psychiatric Advance Directive? No  If the patient does not have a surrogate or Medical Advance Directive AND Psychiatric Advance Directive, the patient was offered information on these advance directives Patient declined to complete    Patient Instructions: Please continue all medications until otherwise directed by physician. Tobacco Cessation Discharge Plan:   Is the patient a smoker and needs referral for smoking cessation? Yes  Patient referred to the following for smoking cessation with an appointment? Yes     Patient was offered medication to assist with smoking cessation at discharge? Yes  Was education for smoking cessation added to the discharge instructions? Yes    Alcohol/Substance Abuse Discharge Plan:   Does the patient have a history of substance/alcohol abuse and requires a referral for treatment? Yes  Patient referred to the following for substance/alcohol abuse treatment with an appointment? Yes  Patient was offered medication to assist with alcohol cessation at discharge? Yes  Was education for substance/alcohol abuse added to discharge instructions?  Yes    Patient discharged to Home; discussed with patient/caregiver and provided to the patient/caregiver either in hard copy or electronically.

## 2021-12-12 ENCOUNTER — HOSPITAL ENCOUNTER (EMERGENCY)
Age: 41
Discharge: HOME | End: 2021-12-12
Attending: STUDENT IN AN ORGANIZED HEALTH CARE EDUCATION/TRAINING PROGRAM
Payer: COMMERCIAL

## 2021-12-12 VITALS
TEMPERATURE: 98 F | SYSTOLIC BLOOD PRESSURE: 134 MMHG | BODY MASS INDEX: 24.46 KG/M2 | HEIGHT: 70 IN | WEIGHT: 170.86 LBS | OXYGEN SATURATION: 99 % | DIASTOLIC BLOOD PRESSURE: 103 MMHG | RESPIRATION RATE: 18 BRPM | HEART RATE: 79 BPM

## 2021-12-12 DIAGNOSIS — F31.10 BIPOLAR AFFECTIVE DISORDER, CURRENT EPISODE MANIC, CURRENT EPISODE SEVERITY UNSPECIFIED (HCC): Primary | ICD-10-CM

## 2021-12-12 LAB
ALBUMIN SERPL-MCNC: 3.9 G/DL (ref 3.5–5)
ALBUMIN/GLOB SERPL: 1.5 {RATIO} (ref 1.1–2.2)
ALP SERPL-CCNC: 64 U/L (ref 45–117)
ALT SERPL-CCNC: 34 U/L (ref 12–78)
ANION GAP SERPL CALC-SCNC: 5 MMOL/L (ref 5–15)
APAP SERPL-MCNC: <2 UG/ML (ref 10–30)
AST SERPL-CCNC: 24 U/L (ref 15–37)
BASOPHILS # BLD: 0 K/UL (ref 0–0.1)
BASOPHILS NFR BLD: 0 % (ref 0–1)
BILIRUB SERPL-MCNC: 0.6 MG/DL (ref 0.2–1)
BUN SERPL-MCNC: 10 MG/DL (ref 6–20)
BUN/CREAT SERPL: 10 (ref 12–20)
CALCIUM SERPL-MCNC: 9.2 MG/DL (ref 8.5–10.1)
CHLORIDE SERPL-SCNC: 107 MMOL/L (ref 97–108)
CO2 SERPL-SCNC: 28 MMOL/L (ref 21–32)
COMMENT, HOLDF: NORMAL
COVID-19 RAPID TEST, COVR: NOT DETECTED
CREAT SERPL-MCNC: 1.01 MG/DL (ref 0.7–1.3)
DIFFERENTIAL METHOD BLD: ABNORMAL
EOSINOPHIL # BLD: 0.1 K/UL (ref 0–0.4)
EOSINOPHIL NFR BLD: 2 % (ref 0–7)
ERYTHROCYTE [DISTWIDTH] IN BLOOD BY AUTOMATED COUNT: 11.2 % (ref 11.5–14.5)
ETHANOL SERPL-MCNC: <10 MG/DL
GLOBULIN SER CALC-MCNC: 2.6 G/DL (ref 2–4)
GLUCOSE SERPL-MCNC: 89 MG/DL (ref 65–100)
HCT VFR BLD AUTO: 38.8 % (ref 36.6–50.3)
HGB BLD-MCNC: 13.5 G/DL (ref 12.1–17)
IMM GRANULOCYTES # BLD AUTO: 0 K/UL (ref 0–0.04)
IMM GRANULOCYTES NFR BLD AUTO: 0 % (ref 0–0.5)
LYMPHOCYTES # BLD: 1.4 K/UL (ref 0.8–3.5)
LYMPHOCYTES NFR BLD: 25 % (ref 12–49)
MCH RBC QN AUTO: 33.9 PG (ref 26–34)
MCHC RBC AUTO-ENTMCNC: 34.8 G/DL (ref 30–36.5)
MCV RBC AUTO: 97.5 FL (ref 80–99)
MONOCYTES # BLD: 0.6 K/UL (ref 0–1)
MONOCYTES NFR BLD: 10 % (ref 5–13)
NEUTS SEG # BLD: 3.5 K/UL (ref 1.8–8)
NEUTS SEG NFR BLD: 63 % (ref 32–75)
NRBC # BLD: 0 K/UL (ref 0–0.01)
NRBC BLD-RTO: 0 PER 100 WBC
PLATELET # BLD AUTO: 144 K/UL (ref 150–400)
PMV BLD AUTO: 10.3 FL (ref 8.9–12.9)
POTASSIUM SERPL-SCNC: 4.3 MMOL/L (ref 3.5–5.1)
PROT SERPL-MCNC: 6.5 G/DL (ref 6.4–8.2)
RBC # BLD AUTO: 3.98 M/UL (ref 4.1–5.7)
SALICYLATES SERPL-MCNC: 4.4 MG/DL (ref 2.8–20)
SAMPLES BEING HELD,HOLD: NORMAL
SODIUM SERPL-SCNC: 140 MMOL/L (ref 136–145)
SOURCE, COVRS: NORMAL
WBC # BLD AUTO: 5.7 K/UL (ref 4.1–11.1)

## 2021-12-12 PROCEDURE — 82077 ASSAY SPEC XCP UR&BREATH IA: CPT

## 2021-12-12 PROCEDURE — 87636 SARSCOV2 & INF A&B AMP PRB: CPT

## 2021-12-12 PROCEDURE — 80179 DRUG ASSAY SALICYLATE: CPT

## 2021-12-12 PROCEDURE — 80053 COMPREHEN METABOLIC PANEL: CPT

## 2021-12-12 PROCEDURE — 87635 SARS-COV-2 COVID-19 AMP PRB: CPT

## 2021-12-12 PROCEDURE — 85025 COMPLETE CBC W/AUTO DIFF WBC: CPT

## 2021-12-12 PROCEDURE — 36415 COLL VENOUS BLD VENIPUNCTURE: CPT

## 2021-12-12 PROCEDURE — 99282 EMERGENCY DEPT VISIT SF MDM: CPT

## 2021-12-12 PROCEDURE — 80143 DRUG ASSAY ACETAMINOPHEN: CPT

## 2021-12-12 NOTE — ED NOTES
Pt brought in under an ECO \" per patient he was going to check up on his father who wouldn't answer the door. States he wasn't trying to do anything but check up on his dad. \" Per Police pt was found climbing on the roof and was reported to be looking in windows and urinating in the back yard. Pt Denies SI and HI  And states he does not want to be here and does not feel like he should be ECO'ed.

## 2021-12-12 NOTE — ED PROVIDER NOTES
EMERGENCY DEPARTMENT HISTORY AND PHYSICAL EXAM      Date: 12/12/2021  Patient Name: Joy Jameson    History of Presenting Illness     Chief Complaint   Patient presents with    Mental Health Problem     arrived [facility] with [address] [facility]- found crawling on someones roof- hx of mental health issues       History Provided By: Police, patient    HPI: Joy Jameson, 46 y.o. male with past medical history of mental health diagnosis-- bipolar versus schizophrenia per police at bedside, presents to the ED with police after being [address] for cc of bizarre behavior with concern for acute psychosis. Patient was reportedly found crawling on the roof, and spying on his estranged father. Per police, patient has not been in contact with his father for several years. He was found peering into the house, urinating on the lawn, and walking around with a crossbow. Police report that patient did not endorse any active suicidality or homicidality. Patient tells me that he has no known mental health diagnoses aside from ADHD. He states \"they tell me I have all sorts of things, but that is not true. \"  He denies taking any medications. States that he was just worried about his father, and had a feeling that he \"should check up on him. \"  He does not elaborate further on why. He tells me that he was walking around with a crossbow because his car did not have gas in it, and he did not want to leave his valuables behind. States that he had no intention of hurting anyone. Denies any suicidal ideation. Denies any hallucinations. He states that he has not been using drugs or drinking alcohol. His only complaint at this time is regarding his handcuffs causing him discomfort. Of note, on chart reviewpatient was recently admitted for bipolar disorder with psychotic features at the end of November/beginning of December. PCP: None    No current facility-administered medications on file prior to encounter.      Current Outpatient Medications on File Prior to Encounter   Medication Sig Dispense Refill    hydrOXYzine HCL (ATARAX) 50 mg tablet Take 1 Tablet by mouth three (3) times daily as needed for Anxiety for up to 10 days. Indications: anxious 30 Tablet 0       Past History     Past Medical History:  Past Medical History:   Diagnosis Date    Psychiatric disorder        Past Surgical History:  No past surgical history on file. Family History:  No family history on file. Social History:  Social History     Tobacco Use    Smoking status: Current Every Day Smoker    Smokeless tobacco: Not on file   Substance Use Topics    Alcohol use: Yes    Drug use: Not on file       Allergies:  No Known Allergies      Review of Systems   Review of Systems   Unable to perform ROS: Psychiatric disorder       Physical Exam   Physical Exam  Vitals and nursing note reviewed. Constitutional:       General: He is not in acute distress. Appearance: Normal appearance. He is not ill-appearing or toxic-appearing. HENT:      Head: Normocephalic and atraumatic. Nose: Nose normal.      Mouth/Throat:      Mouth: Mucous membranes are moist.   Eyes:      Extraocular Movements: Extraocular movements intact. Pupils: Pupils are equal, round, and reactive to light. Cardiovascular:      Rate and Rhythm: Normal rate and regular rhythm. Pulses: Normal pulses. Pulmonary:      Effort: Pulmonary effort is normal. No respiratory distress. Breath sounds: Normal breath sounds. No stridor. No wheezing or rhonchi. Abdominal:      General: Abdomen is flat. There is no distension. Palpations: There is no mass. Tenderness: There is no abdominal tenderness. Musculoskeletal:         General: Normal range of motion. Cervical back: Normal range of motion and neck supple. Skin:     General: Skin is warm and dry. Neurological:      General: No focal deficit present. Mental Status: He is alert. Mental status is at baseline. Sensory: No sensory deficit. Motor: No weakness. Psychiatric:         Attention and Perception: He is inattentive. He does not perceive auditory or visual hallucinations. Mood and Affect: Affect is angry. Behavior: Behavior is agitated. Thought Content: Thought content does not include homicidal or suicidal ideation. Thought content does not include homicidal or suicidal plan. Judgment: Judgment is impulsive. Diagnostic Study Results     Labs -     Recent Results (from the past 24 hour(s))   CBC WITH AUTOMATED DIFF    Collection Time: 12/12/21  1:09 PM   Result Value Ref Range    WBC 5.7 4.1 - 11.1 K/uL    RBC 3.98 (L) 4.10 - 5.70 M/uL    HGB 13.5 12.1 - 17.0 g/dL    HCT 38.8 36.6 - 50.3 %    MCV 97.5 80.0 - 99.0 FL    MCH 33.9 26.0 - 34.0 PG    MCHC 34.8 30.0 - 36.5 g/dL    RDW 11.2 (L) 11.5 - 14.5 %    PLATELET 282 (L) 851 - 400 K/uL    MPV 10.3 8.9 - 12.9 FL    NRBC 0.0 0  WBC    ABSOLUTE NRBC 0.00 0.00 - 0.01 K/uL    NEUTROPHILS 63 32 - 75 %    LYMPHOCYTES 25 12 - 49 %    MONOCYTES 10 5 - 13 %    EOSINOPHILS 2 0 - 7 %    BASOPHILS 0 0 - 1 %    IMMATURE GRANULOCYTES 0 0.0 - 0.5 %    ABS. NEUTROPHILS 3.5 1.8 - 8.0 K/UL    ABS. LYMPHOCYTES 1.4 0.8 - 3.5 K/UL    ABS. MONOCYTES 0.6 0.0 - 1.0 K/UL    ABS. EOSINOPHILS 0.1 0.0 - 0.4 K/UL    ABS. BASOPHILS 0.0 0.0 - 0.1 K/UL    ABS. IMM.  GRANS. 0.0 0.00 - 0.04 K/UL    DF AUTOMATED     METABOLIC PANEL, COMPREHENSIVE    Collection Time: 12/12/21  1:09 PM   Result Value Ref Range    Sodium 140 136 - 145 mmol/L    Potassium 4.3 3.5 - 5.1 mmol/L    Chloride 107 97 - 108 mmol/L    CO2 28 21 - 32 mmol/L    Anion gap 5 5 - 15 mmol/L    Glucose 89 65 - 100 mg/dL    BUN 10 6 - 20 MG/DL    Creatinine 1.01 0.70 - 1.30 MG/DL    BUN/Creatinine ratio 10 (L) 12 - 20      GFR est AA >60 >60 ml/min/1.73m2    GFR est non-AA >60 >60 ml/min/1.73m2    Calcium 9.2 8.5 - 10.1 MG/DL    Bilirubin, total 0.6 0.2 - 1.0 MG/DL    ALT (SGPT) 34 12 - 78 U/L    AST (SGOT) 24 15 - 37 U/L    Alk. phosphatase 64 45 - 117 U/L    Protein, total 6.5 6.4 - 8.2 g/dL    Albumin 3.9 3.5 - 5.0 g/dL    Globulin 2.6 2.0 - 4.0 g/dL    A-G Ratio 1.5 1.1 - 2.2     COVID-19 RAPID TEST    Collection Time: 12/12/21  1:09 PM   Result Value Ref Range    Specimen source Nasopharyngeal      COVID-19 rapid test Not detected NOTD     ETHYL ALCOHOL    Collection Time: 12/12/21  1:09 PM   Result Value Ref Range    ALCOHOL(ETHYL),SERUM <10 <10 MG/DL   ACETAMINOPHEN    Collection Time: 12/12/21  1:09 PM   Result Value Ref Range    Acetaminophen level <2 (L) 10 - 30 ug/mL   SALICYLATE    Collection Time: 12/12/21  1:09 PM   Result Value Ref Range    Salicylate level 4.4 2.8 - 20.0 MG/DL   SAMPLES BEING HELD    Collection Time: 12/12/21  1:09 PM   Result Value Ref Range    SAMPLES BEING HELD SST, RED     COMMENT        Add-on orders for these samples will be processed based on acceptable specimen integrity and analyte stability, which may vary by analyte. Radiologic Studies -   No orders to display     CT Results  (Last 48 hours)    None        CXR Results  (Last 48 hours)    None          Medical Decision Making   I, Carnella Galeazzi, MD am the first provider for this patient, and I am the attending of record for this patient encounter. I reviewed the vital signs, available nursing notes, past medical history, past surgical history, family history and social history. Vital Signs-Reviewed the patient's vital signs. Patient Vitals for the past 24 hrs:   Temp Pulse Resp BP SpO2   12/12/21 0953 98 °F (36.7 °C) 79 18 (!) 134/103 99 %     Records Reviewed: Nursing Notes and Old Medical Records    Provider Notes (Medical Decision Making):   Vitals are stable. Patient appears slightly agitated and angry about his current situation, but otherwise does not appear to be overtly psychotic or internally stimulated. Patient denying SI/HI. We will check medical clearance labs.   Will have crisis evaluate the patient for disposition planning. ED Course as of 12/12/21 1451   Sun Dec 12, 2021   1446 Patient apparently evaluated by crisis, who apparently recommended no admission. Police officers removed cuffs, and patient subsequently left the ED. I was never notified by crisis worker. Patient was already gone from the ED by the time I was notified by RN. Recommendations for discharge and rationale for this recommendations, or plan regarding follow up care for patient was never discussed with me personally by crisis worker. [JM]      ED Course User Index  [JM] Cha Meehan MD       ED Course:   Initial assessment performed. The patient's presenting problems have been discussed, and they are in agreement with the care plan formulated and outlined with them. I have encouraged them to ask questions as they arise throughout their visit. Mireya Cormier MD      Disposition:  Discharge      DISCHARGE PLAN:  1. Discharge Medication List as of 12/12/2021  3:00 PM        2. Follow-up Information    None       3. Return to ED if worse     Diagnosis     Clinical Impression:   1. Bipolar affective disorder, current episode manic, current episode severity unspecified ([facility]        Attestations:    Mireya Cormier MD    Please note that this dictation was completed with [employer], the computer voice recognition software. Quite often unanticipated grammatical, syntax, homophones, and other interpretive errors are inadvertently transcribed by the computer software. Please disregard these errors. Please excuse any errors that have escaped final proofreading. Thank you.

## 2021-12-12 NOTE — ED NOTES
Crisis at bedside. Stated to pt that they were not going to admit pt and Officers removed Cuffs. RN provided pt with a meal tray.

## 2021-12-13 LAB
FLUAV RNA SPEC QL NAA+PROBE: NOT DETECTED
FLUBV RNA SPEC QL NAA+PROBE: NOT DETECTED
SARS-COV-2, COV2: NOT DETECTED

## 2022-03-18 PROBLEM — F31.2 BIPOLAR DISORDER, CURRENT EPISODE MANIC, SEVERE WITH PSYCHOTIC FEATURES (HCC): Status: ACTIVE | Noted: 2021-11-29

## 2023-08-17 ENCOUNTER — HOSPITAL ENCOUNTER (EMERGENCY)
Facility: HOSPITAL | Age: 43
Discharge: HOME | End: 2023-08-17
Attending: STUDENT IN AN ORGANIZED HEALTH CARE EDUCATION/TRAINING PROGRAM

## 2023-08-17 VITALS
OXYGEN SATURATION: 97 % | RESPIRATION RATE: 20 BRPM | WEIGHT: 185 LBS | BODY MASS INDEX: 25.9 KG/M2 | SYSTOLIC BLOOD PRESSURE: 192 MMHG | DIASTOLIC BLOOD PRESSURE: 96 MMHG | HEIGHT: 71 IN | HEART RATE: 136 BPM | TEMPERATURE: 98.1 F

## 2023-08-17 DIAGNOSIS — Z23 NEED FOR TETANUS BOOSTER: Primary | ICD-10-CM

## 2023-08-17 DIAGNOSIS — S81.811A LACERATION OF RIGHT LOWER EXTREMITY, INITIAL ENCOUNTER: ICD-10-CM

## 2023-08-17 PROCEDURE — 6360000002 HC RX W HCPCS: Performed by: STUDENT IN AN ORGANIZED HEALTH CARE EDUCATION/TRAINING PROGRAM

## 2023-08-17 PROCEDURE — 2500000003 HC RX 250 WO HCPCS: Performed by: STUDENT IN AN ORGANIZED HEALTH CARE EDUCATION/TRAINING PROGRAM

## 2023-08-17 PROCEDURE — 90471 IMMUNIZATION ADMIN: CPT | Performed by: STUDENT IN AN ORGANIZED HEALTH CARE EDUCATION/TRAINING PROGRAM

## 2023-08-17 PROCEDURE — 90715 TDAP VACCINE 7 YRS/> IM: CPT | Performed by: STUDENT IN AN ORGANIZED HEALTH CARE EDUCATION/TRAINING PROGRAM

## 2023-08-17 PROCEDURE — 99284 EMERGENCY DEPT VISIT MOD MDM: CPT

## 2023-08-17 RX ORDER — LIDOCAINE HYDROCHLORIDE AND EPINEPHRINE 20; 5 MG/ML; UG/ML
INJECTION, SOLUTION EPIDURAL; INFILTRATION; INTRACAUDAL; PERINEURAL
Status: DISCONTINUED
Start: 2023-08-17 | End: 2023-08-18 | Stop reason: HOSPADM

## 2023-08-17 RX ORDER — LIDOCAINE HYDROCHLORIDE AND EPINEPHRINE BITARTRATE 20; .01 MG/ML; MG/ML
20 INJECTION, SOLUTION SUBCUTANEOUS ONCE
Status: COMPLETED | OUTPATIENT
Start: 2023-08-17 | End: 2023-08-17

## 2023-08-17 RX ADMIN — LIDOCAINE HYDROCHLORIDE AND EPINEPHRINE 20 ML: 20; 10 INJECTION, SOLUTION INFILTRATION; PERINEURAL at 21:05

## 2023-08-17 RX ADMIN — TETANUS TOXOID, REDUCED DIPHTHERIA TOXOID AND ACELLULAR PERTUSSIS VACCINE, ADSORBED 0.5 ML: 5; 2.5; 8; 8; 2.5 SUSPENSION INTRAMUSCULAR at 21:05

## 2023-08-17 ASSESSMENT — PAIN - FUNCTIONAL ASSESSMENT: PAIN_FUNCTIONAL_ASSESSMENT: 0-10

## 2023-08-17 ASSESSMENT — PAIN SCALES - GENERAL: PAINLEVEL_OUTOF10: 10

## 2023-08-18 NOTE — ED PROVIDER NOTES
injection 0.5 mL (0.5 mLs IntraMUSCular Given 8/17/23 2105)       Medical Decision Making  47yoM with PMH of schizophrenia, bipolar presenting with lacerations under police custody. Vitals show notable tachycardia. Patient agitated, a bit psychotic, making gestures about [employer]. Forgets his last tetanus shot. Scattered lacerations, hemostatic, procedure note per JCARLOS. No indications for antibiotics or sutures. No abscess or cellulitis noted. Pulses intact, no signs of arterial injury, tendon or nerve damage. No indication for acute labwork or imaging. Considered admitting but he is under police custody and will be discharged into their care. Will update IM boostrix tetanus vaccine. Risk  Prescription drug management. ED Course as of 08/17/23 2115   Thu Aug 17, 2023   2058 Procedure Note - Foreign Body Skin:  8:58 PM EDT  Performed by: NARGIS Valladares  Foreign body was seen/felt in the right lower leg in multiple locations. Procedural sedation was not used. The areas were anesthestized with Lidocaine 2% with epi - total 2 mL. Pick ups were used to easily remove 3 pieces of glass from the right lower leg in various locations. There are multiple other puncture wounds without appreciable FB. Estimated blood loss: minimal  The procedure took 1-15 minutes, and pt tolerated well  [TK]      ED Course User Index  [TK] NARGIS Valladares         FINAL IMPRESSION     1. Need for tetanus booster    2. Laceration of right lower extremity, initial encounter          DISPOSITION/PLAN   Jed Perez's  results have been reviewed with him. He has been counseled regarding his diagnosis, treatment, and plan. He verbally conveys understanding and agreement of the signs, symptoms, diagnosis, treatment and prognosis and additionally agrees to follow up as discussed. He also agrees with the care-plan and conveys that all of his questions have been answered.   I have also provided discharge

## 2023-08-18 NOTE — ED NOTES
Patient discharged by [facility] LALI SERRA MD in police custody, no acute distress noted at time of discharge - Discharge information / home RX / and reasons to return to the ED were reviewed by the ED provider. Shruthi Reyes RN  08/17/23 2109 2119: Nancy NICKERSON bandaged pt''s wounds.  Pt d/c in police custody at this time      Shruthi Reyes RN  08/17/23 2119

## 2024-03-01 NOTE — BH NOTES
GROUP THERAPY PROGRESS NOTE    Patient is participating in coping skills group. Group time: 45 minutes    Personal goal for participation: Learn coping skills to reduce negative emotions    Goal orientation: Personal    Group therapy participation: active    Therapeutic interventions reviewed and discussed: Group discussion on why being bored can be a problem and the consequences of boredom that patient have experienced. Patient discussed issues they have had with being bored and ways they have tried to combat boredom. This lead to discussion of coping skills for negative emotions and ways to feel better that each patient has tried or that they have heard of. Discussion of activities that help with boredom, challenges, potential solutions and plans. Impression of participation: Francesco Lovelace was present and active in group discussion. He reports feeling okay today and was waiting to see the treatment team. He reports that he enjoys participating in activities outside. He shared that he likes to [place] and walk around and enjoy the peace and quiet of nature. Patient was interactive with others who were present in group and shared stories about various coping skills. He was calm and cooperative and mood and affect were within normal limits.     Belle Garcia [facility] Speaking Coherently